# Patient Record
Sex: FEMALE | Race: AMERICAN INDIAN OR ALASKA NATIVE | NOT HISPANIC OR LATINO | URBAN - METROPOLITAN AREA
[De-identification: names, ages, dates, MRNs, and addresses within clinical notes are randomized per-mention and may not be internally consistent; named-entity substitution may affect disease eponyms.]

---

## 2019-03-14 ENCOUNTER — TELEPHONE (OUTPATIENT)
Dept: TRANSPLANT | Facility: CLINIC | Age: 29
End: 2019-03-14

## 2019-03-14 NOTE — TELEPHONE ENCOUNTER
"[facility] BREEZE  [id number]      LIVING KIDNEY DONOR EVALUATION  Donor First Name Cinthia Donor DOROTA    Donor Last Name Rowdy Completed 3/8/2019 9:53 PM    1990 Record ID [id number]   BREEZE Screen PASSED     Intended Recipient  Recipient First Name Gold Recipient MRSIVAN    Recipient Last Name Middletent Relationship Half Sibling   Recipient  2003 Recipient Diagnosis    Recipient's ABO      Donor Information  Age 28 Gender Female   Ht 165 cm (5' 5'') Race  or    Wt 61.2 kg (135 lbs) Ethnicity Not /   BMI 22.50 kg/m  Preferred Language English      Required No     Blood Type Unknown   Demographics  Home Address [address] [place] [address] # [phone]   [facility] Type Mobile   State [place] Alternate # [phone]   Zip Code [address] Type Mobile   Country [place] Preferred Contact day Mon, Fri, Thur, Wed, Tue   Email [email or web address] Preferred Contact time 1:00 PM-4:00 PM, 09:00 AM-11:00 AM   &&   Donor's Medical Information  Medical History \"none\" Medications \"Doterra Essential Oils\"   \"Equate one daily womens health multivitamin\"   Surgical History None Reported Allergies NKDA   Social History EtOH: Rare (1-2 drinks/year)   Illicit Drug Use: Remote: Cocaine Last Used    Tobacco: Remote; Quit ; (1/4 ppd x 2 years) Self-Reported Functional Status \"I am able to participate in strenuous sports such as swimming, singles tennis, football, basketball, or skiing\"   Family Medical History Cancer (denies)   Diabetes (Sibling, Grandparent)   Heart Disease (denies)   Hypertension (Sibling)   Kidney Disease (Sibling)   Kidney Stones (denies) Exercise Frequency Exercise (1 X per week)   Review of Organ Systems  Review of Systems Airway or Lungs: No   Blood Disorder: No   Cancer: No   Diabetes,Thyroid,Adrenal,Endocrine Disorder: No   Digestive or Liver: Yes   Female Health: No   Heart or Circulatory System: No "   Immune Diseases: No   Kidneys and Bladder: No   Muscles,Bones,Joints: No   Neuro: No   Psych: No   &&   Donor's Social Information  Marital Status  Living Accommodation Lives in rented accommodation   Level of 's or technical degree complete Living Arrangement With spouse   Employment Status Full Time Concerns: health and life insurance No   Employer [employer] Concerns: job security and lost income No   Occupation      Medical Insurance Status Has medical insurance     High Risk Behavior  High Risk Behaviors Blood transfusion < 12 months. (NO)   Commercial sex < 12 months. (NO)   Illicit IV drug use < 5yrs. (NO)   Other high risk sexual contact < 12 months. (NO)   Reason for Donation  Referral Friend or Family of Tx Candidate Reason for Donation I believe my brother will benefit from my kidney, and be free from doing dialysis and other issues related to his kidney disease that has affected his daily life.   Permission to Disclose Inquiry Yes Patient Comments    Donor Motivation Level Highly motivated donor     PCP Contact  PCP Name [facility]   PCP [facility]   PCP [place]   PCP Phone [phone]   Emergency Contact  First Name Jehovah's witness First Name Emma   Last Name Rowdy Last Name Rosalio Kitchen   Phone # [phone] Phone # [phone]   Phone Type Mobile Phone Type Mobile   Relationship Spouse Relationship Mother   Office Use  Reviewed By    Reviewed 3/14/2019 2:20 PM   Admin Folder Accept   Comments 3/11/2019 Eval Passed   3/14/2019 archived pkt sent   Lost for Followup    Extended Comments    BREEZE ID [id number] survey status completed   Open Activities  Other  Task    Due Date    Comments No voice mailbox.LM via Email to return call for screening.   Activity History  Call  Task    Due Date 3/14/2019   Last Modified Date/Time 3/14/2019 10:16 AM   Comments    Other  Task    Due Date 3/12/2019    Last Modified Date/Time 3/12/2019 9:48 AM   Comments

## 2019-03-15 DIAGNOSIS — Z00.5 TRANSPLANT DONOR EVALUATION: Primary | ICD-10-CM

## 2021-10-18 ENCOUNTER — DOCUMENTATION ONLY (OUTPATIENT)
Dept: TRANSPLANT | Facility: CLINIC | Age: 31
End: 2021-10-18

## 2021-10-25 ENCOUNTER — TELEPHONE (OUTPATIENT)
Dept: TRANSPLANT | Facility: CLINIC | Age: 31
End: 2021-10-25

## 2021-10-25 NOTE — TELEPHONE ENCOUNTER
I called Cinthia today to complete her initial CHEN call.  I got her voice mail message indicating that they voicemail box has not been set up yet and I was not able to leave a message.  Therefore, I could not do my call with her today.  I sent her an e-mail message to reschedule this phone call.    HELENA Tan, [facility]  Donor  and Independent Living Donor Advocate  [facility], [facility]  Pager:  [phone]  Direct:  [phone] Cell   E-Mail:  [email or web address]

## 2021-10-25 NOTE — TELEPHONE ENCOUNTER
Donor emailed requesting CHEN call reschedule.  Rescheduled to 230pm Tues 10/26 and emailed her the date/time.

## 2021-10-26 ENCOUNTER — TELEPHONE (OUTPATIENT)
Dept: TRANSPLANT | Facility: CLINIC | Age: 31
End: 2021-10-26

## 2021-10-26 NOTE — TELEPHONE ENCOUNTER
Initial Independent Living Donor Advocate contact made with potential donor today.  I introduced myself and my role during the donation process, includin.  CHEN ROLE   The federal government requires that all licensed transplant centers provide the living donor with an Independent Living Donor Advocate (CHEN).  I do not meet recipients or attend meetings that discuss their care or decision to transplant them. My role is separate to avoid any conflict of interest.  My role is to ensure:  1) your rights are protected;  2) you get all the information you need from the transplant team to make a fully informed decision whether to donate;   3) that living donation is in your best interest.   4) that you have the right to decide NOT to go forward with living donation at any time during this process.  I am available to you throughout the workup, during surgery phase and follow-up at home.   2. WORKUP & PRIVACY     Your identity and workup are not shared with the recipient at any time.     There is a medical donor workup that consists of testing to determine if you are healthy enough to donate.  Workup tests include many blood draws, urine collection/ (kidney function testing), chest x-ray, EKG, CT scan. As you complete each step then you may move on to the next.  Workup can take as little or as long as you need and you can stop the process at any time.     Transplant is a treatment option, not a cure. A kidney from a living kidney donor can last 12-14 years.  Other treatment options are  donation and two types of dialysis.     This is major surgery and your estimated hospital stay is approximately 1-2 nights.  After surgery, there are driving and lifting restrictions - no driving for two weeks and no lifting over ten pounds for 6 - 8 weeks.  Donors are routinely off from work for 4 - 6 weeks after surgery, and potentially longer if they have a physical job.       If you anticipate lost wages due to donation,  donor wage reimbursement options may be available to you and will be reviewed with you during the evaluation process.      The recipient's insurance covers the medical expenses related to the donor evaluation and surgery.  However, it is important for you to carry your own health insurance to address any medical issues that are found and are NOT related to living donation.  3.  QUESTIONS  Have you received a packet from the transplant department?     Questions?    Have you discussed with anyone your potential decision to donate?  Her mother  Is anyone pressuring or coercing you to donate? No.    Have you discussed any financial arrangements with recipient around donating a kidney? no  Are you aware that you can confidentially opt out at any time, up to and including day of donation? ye  At this time, would you like to proceed with the medical evaluation to see if you can be a kidney donor?ye    If yes, the donor coordinator will be reaching out to you with next steps.     You can reach me or someone else on the CHEN team by calling [phone] Option 3.    CHEN NOTES: She would like to move forward with the donor evaluation.  She is interested in further conversations about risks, sine she has an 11 year old daughter.  She [place] 10 hours from the transplant center, so I will e-mail her information about NLDAC.  An appointment with coordinator, Fabiola has been scheduled for 11/15    Duration of call 50 minutes including anton information.

## 2021-11-15 ENCOUNTER — TELEPHONE (OUTPATIENT)
Dept: TRANSPLANT | Facility: CLINIC | Age: 31
End: 2021-11-15

## 2021-11-15 NOTE — TELEPHONE ENCOUNTER
Contacted Cinthia Reno to introduce myself and my role, review of medical/surgical/family history and next steps.     Cinthia Reno  is aware She can stop donor evaluation at any time.    Have you ever been positive for COVID 19? no    Have you received the COVID vaccination? yes If yes, when and what brand? abhinav Reno is a 31 year old female  ABO O that would like to learn more about donation to her brother.     Concerns from medical/surgical/family history: non    Reviewed any history of travel in endemic areas:   Strongyloides- [place], [place] and [place].  Trypanosoma cruzi ([place])- [place]  West Nile Virus- [place], [place], [place], [place] and [place].     Per our Phase 1 algorithm, does meet criteria to do preliminary testing.    Reviewed preliminary lab tests.     Reviewed evaluation testing: Covid PCR, Iohexol, Lab work, CXR, EKG, Provider visits and functions, CT Angiogram.     Reviewed operations of selection committee and angio review meetings and the need for multidisciplinary input.     Reviewed NKR listing and transfer of care to [facility] team if approved. Provided Cinthia with NKR website to review.     Briefly went over options if approved of NDD, SVP and FVP.    Confirmed that Cinthia reviewed Informed consent document and all questions answered.  Reviewed that they will receive Docusign to obtain electronic signature for the following: Informed consent, SRTR data, NEERU for medical information, Auth for Electronic communication and will need their signed consent back before proceeding with evaluation.      Encouraged sign up for MyChart and confirmed [facility] sign up.     Verified recipient status if not NDD.    Donor timeline: TBD     Will send orders to scheduling team to set up for phase 1 testing.

## 2021-11-16 ENCOUNTER — DOCUMENTATION ONLY (OUTPATIENT)
Dept: TRANSPLANT | Facility: CLINIC | Age: 31
End: 2021-11-16

## 2021-11-16 DIAGNOSIS — Z00.5 TRANSPLANT DONOR EVALUATION: Primary | ICD-10-CM

## 2021-12-01 ENCOUNTER — TELEPHONE (OUTPATIENT)
Dept: TRANSPLANT | Facility: CLINIC | Age: 31
End: 2021-12-01

## 2021-12-03 ENCOUNTER — DOCUMENTATION ONLY (OUTPATIENT)
Dept: TRANSPLANT | Facility: CLINIC | Age: 31
End: 2021-12-03

## 2021-12-03 NOTE — PROGRESS NOTES
"[facility] from [facility] (no phone #):BP's:102/56,115/55,115/62 Ht= 5' 6\" Qg=899.5#'s  "

## 2021-12-05 ENCOUNTER — HEALTH MAINTENANCE LETTER (OUTPATIENT)
Age: 31
End: 2021-12-05

## 2021-12-07 ENCOUNTER — TELEPHONE (OUTPATIENT)
Dept: TRANSPLANT | Facility: CLINIC | Age: 31
End: 2021-12-07

## 2021-12-07 NOTE — TELEPHONE ENCOUNTER
Informed Cinthia that we have recewived her vitals and only her abo. Asked her to check with clinic to see if other labs can be sent.

## 2021-12-08 ENCOUNTER — TELEPHONE (OUTPATIENT)
Dept: TRANSPLANT | Facility: CLINIC | Age: 31
End: 2021-12-08

## 2021-12-08 NOTE — TELEPHONE ENCOUNTER
Contacted Cinthia to review phase 1 results    Reviewed preliminary lab tests.     Reviewed evaluation testing: Covid PCR, Iohexol, Lab work, CXR, EKG, Provider visits and functions, CT Angiogram.     Reviewed operations of selection committee and angio review meetings and the need for multidisciplinary input.     Reviewed NKR listing and transfer of care to [facility] team if approved. Provided Cinthia with NKR website to review.     Briefly went over options if approved of NDD, SVP and FVP.     Cinthia would like to proceed to evaluation on 12/30/21 if possible with Iohexol on 12/30/21 and COVID PCR prior to Iohexol.     Confirmed that Cinthia reviewed Informed consent document and all questions answered.  Reviewed that they will receive Docusign to obtain electronic signature for the following: Informed consent, SRTR data, NEERU for medical information, Auth for Electronic communication and will need their signed consent back before proceeding with evaluation.      Encouraged sign up for MyChart and confirmed [facility] sign up.    Verified recipient status if not NDD.    Donor timeline: TBD     Will send orders to scheduling team to set up for evaluation testing.

## 2021-12-09 ENCOUNTER — TELEPHONE (OUTPATIENT)
Dept: TRANSPLANT | Facility: CLINIC | Age: 31
End: 2021-12-09

## 2021-12-09 NOTE — TELEPHONE ENCOUNTER
I attempted to call Cinthia today, but I could not leave her a voice mail message since the recording I get says that her voice mail box had not been set up yet.  I emailed her today about getting in her NLDA application so that she can have assistance with travel here for her donor evaluation on 12/30/2021.    HELENA Tan, [facility]  Living Donor   [facility], [facility], [facility]  Pager:  [phone]  Direct:  [phone] Cell Phone  E-Mail:  [email or web address]

## 2021-12-10 DIAGNOSIS — Z00.5 TRANSPLANT DONOR EVALUATION: Primary | ICD-10-CM

## 2021-12-10 RX ORDER — ALBUTEROL SULFATE 90 UG/1
1-2 AEROSOL, METERED RESPIRATORY (INHALATION)
Status: CANCELLED
Start: 2021-12-30

## 2021-12-10 RX ORDER — ALBUTEROL SULFATE 0.83 MG/ML
2.5 SOLUTION RESPIRATORY (INHALATION)
Status: CANCELLED | OUTPATIENT
Start: 2021-12-30

## 2021-12-10 RX ORDER — METHYLPREDNISOLONE SODIUM SUCCINATE 125 MG/2ML
125 INJECTION, POWDER, LYOPHILIZED, FOR SOLUTION INTRAMUSCULAR; INTRAVENOUS
Status: CANCELLED
Start: 2021-12-30

## 2021-12-10 RX ORDER — MEPERIDINE HYDROCHLORIDE 25 MG/ML
25 INJECTION INTRAMUSCULAR; INTRAVENOUS; SUBCUTANEOUS EVERY 30 MIN PRN
Status: CANCELLED | OUTPATIENT
Start: 2021-12-30

## 2021-12-10 RX ORDER — EPINEPHRINE 1 MG/ML
0.3 INJECTION, SOLUTION, CONCENTRATE INTRAVENOUS EVERY 5 MIN PRN
Status: CANCELLED | OUTPATIENT
Start: 2021-12-30

## 2021-12-10 RX ORDER — DIPHENHYDRAMINE HYDROCHLORIDE 50 MG/ML
50 INJECTION INTRAMUSCULAR; INTRAVENOUS
Status: CANCELLED
Start: 2021-12-30

## 2021-12-10 RX ORDER — NALOXONE HYDROCHLORIDE 0.4 MG/ML
0.2 INJECTION, SOLUTION INTRAMUSCULAR; INTRAVENOUS; SUBCUTANEOUS
Status: CANCELLED | OUTPATIENT
Start: 2021-12-30

## 2022-01-19 DIAGNOSIS — Z00.5 TRANSPLANT DONOR EVALUATION: ICD-10-CM

## 2022-01-20 ENCOUNTER — OFFICE VISIT (OUTPATIENT)
Dept: INFUSION THERAPY | Facility: CLINIC | Age: 32
End: 2022-01-20
Attending: INTERNAL MEDICINE

## 2022-01-20 ENCOUNTER — APPOINTMENT (OUTPATIENT)
Dept: TRANSPLANT | Facility: CLINIC | Age: 32
End: 2022-01-20
Attending: TRANSPLANT SURGERY

## 2022-01-20 ENCOUNTER — OFFICE VISIT (OUTPATIENT)
Dept: TRANSPLANT | Facility: CLINIC | Age: 32
End: 2022-01-20

## 2022-01-20 ENCOUNTER — OFFICE VISIT (OUTPATIENT)
Dept: NEPHROLOGY | Facility: CLINIC | Age: 32
End: 2022-01-20
Attending: TRANSPLANT SURGERY

## 2022-01-20 ENCOUNTER — OFFICE VISIT (OUTPATIENT)
Dept: TRANSPLANT | Facility: CLINIC | Age: 32
End: 2022-01-20
Attending: SURGERY

## 2022-01-20 ENCOUNTER — ALLIED HEALTH/NURSE VISIT (OUTPATIENT)
Dept: TRANSPLANT | Facility: CLINIC | Age: 32
End: 2022-01-20
Attending: TRANSPLANT SURGERY

## 2022-01-20 ENCOUNTER — LAB (OUTPATIENT)
Dept: LAB | Facility: CLINIC | Age: 32
End: 2022-01-20
Attending: INTERNAL MEDICINE

## 2022-01-20 ENCOUNTER — ANCILLARY PROCEDURE (OUTPATIENT)
Dept: CT IMAGING | Facility: CLINIC | Age: 32
End: 2022-01-20
Attending: INTERNAL MEDICINE

## 2022-01-20 ENCOUNTER — ANCILLARY PROCEDURE (OUTPATIENT)
Dept: GENERAL RADIOLOGY | Facility: CLINIC | Age: 32
End: 2022-01-20
Attending: INTERNAL MEDICINE

## 2022-01-20 VITALS
HEART RATE: 74 BPM | BODY MASS INDEX: 23.74 KG/M2 | SYSTOLIC BLOOD PRESSURE: 103 MMHG | HEIGHT: 65 IN | OXYGEN SATURATION: 100 % | DIASTOLIC BLOOD PRESSURE: 69 MMHG | WEIGHT: 142.5 LBS

## 2022-01-20 VITALS — SYSTOLIC BLOOD PRESSURE: 98 MMHG | DIASTOLIC BLOOD PRESSURE: 55 MMHG

## 2022-01-20 DIAGNOSIS — Z00.5 TRANSPLANT DONOR EVALUATION: ICD-10-CM

## 2022-01-20 DIAGNOSIS — Z00.5 TRANSPLANT DONOR EVALUATION: Primary | ICD-10-CM

## 2022-01-20 LAB
ABO/RH(D): NORMAL
ABO/RH(D): NORMAL
ALBUMIN SERPL-MCNC: 3.8 G/DL (ref 3.4–5)
ALBUMIN UR-MCNC: NEGATIVE MG/DL
ALP SERPL-CCNC: 55 U/L (ref 40–150)
ALT SERPL W P-5'-P-CCNC: 19 U/L (ref 0–50)
ANION GAP SERPL CALCULATED.3IONS-SCNC: 9 MMOL/L (ref 3–14)
ANTIBODY SCREEN: NEGATIVE
APPEARANCE UR: CLEAR
APTT PPP: 32 SECONDS (ref 22–38)
AST SERPL W P-5'-P-CCNC: 14 U/L (ref 0–45)
B-HCG SERPL-ACNC: <1 IU/L (ref 0–5)
BILIRUB SERPL-MCNC: 0.3 MG/DL (ref 0.2–1.3)
BILIRUB UR QL STRIP: NEGATIVE
BUN SERPL-MCNC: 14 MG/DL (ref 7–30)
CALCIUM SERPL-MCNC: 8.6 MG/DL (ref 8.5–10.1)
CHLORIDE BLD-SCNC: 111 MMOL/L (ref 94–109)
CHOLEST SERPL-MCNC: 160 MG/DL
CMV IGG SERPL IA-ACNC: 7.8 U/ML
CMV IGG SERPL IA-ACNC: ABNORMAL
CO2 SERPL-SCNC: 23 MMOL/L (ref 20–32)
COLOR UR AUTO: ABNORMAL
CREAT SERPL-MCNC: 0.75 MG/DL (ref 0.52–1.04)
CREAT UR-MCNC: 50 MG/DL
CREAT UR-MCNC: 50 MG/DL
EBV VCA IGG SER IA-ACNC: >750 U/ML
EBV VCA IGG SER IA-ACNC: POSITIVE
EBV VCA IGM SER IA-ACNC: <10 U/ML
EBV VCA IGM SER IA-ACNC: NORMAL
ERYTHROCYTE [DISTWIDTH] IN BLOOD BY AUTOMATED COUNT: 17.2 % (ref 10–15)
FASTING STATUS PATIENT QL REPORTED: YES
GFR SERPL CREATININE-BSD FRML MDRD: >90 ML/MIN/1.73M2
GLUCOSE BLD-MCNC: 92 MG/DL (ref 70–99)
GLUCOSE UR STRIP-MCNC: NEGATIVE MG/DL
HBA1C MFR BLD: 5.2 % (ref 0–5.6)
HBV CORE AB SERPL QL IA: NONREACTIVE
HBV SURFACE AB SERPL IA-ACNC: 664.32 M[IU]/ML
HBV SURFACE AG SERPL QL IA: NONREACTIVE
HCT VFR BLD AUTO: 34.8 % (ref 35–47)
HCV AB SERPL QL IA: NONREACTIVE
HDLC SERPL-MCNC: 50 MG/DL
HGB BLD-MCNC: 10.9 G/DL (ref 11.7–15.7)
HGB UR QL STRIP: NEGATIVE
HIV 1+2 AB+HIV1 P24 AG SERPL QL IA: NONREACTIVE
INR PPP: 1.11 (ref 0.85–1.15)
KETONES UR STRIP-MCNC: NEGATIVE MG/DL
LDLC SERPL CALC-MCNC: 95 MG/DL
LEUKOCYTE ESTERASE UR QL STRIP: NEGATIVE
MCH RBC QN AUTO: 25.2 PG (ref 26.5–33)
MCHC RBC AUTO-ENTMCNC: 31.3 G/DL (ref 31.5–36.5)
MCV RBC AUTO: 80 FL (ref 78–100)
MICROALBUMIN UR-MCNC: <5 MG/L
MICROALBUMIN/CREAT UR: NORMAL MG/G{CREAT}
MUCOUS THREADS #/AREA URNS LPF: PRESENT /LPF
NITRATE UR QL: NEGATIVE
NONHDLC SERPL-MCNC: 110 MG/DL
PH UR STRIP: 6 [PH] (ref 5–7)
PHOSPHATE SERPL-MCNC: 3.6 MG/DL (ref 2.5–4.5)
PLATELET # BLD AUTO: 361 10E3/UL (ref 150–450)
POTASSIUM BLD-SCNC: 4.1 MMOL/L (ref 3.4–5.3)
PROT SERPL-MCNC: 7.4 G/DL (ref 6.8–8.8)
PROT UR-MCNC: <0.05 G/L
PROT/CREAT 24H UR: NORMAL MG/G{CREAT}
RBC # BLD AUTO: 4.33 10E6/UL (ref 3.8–5.2)
RBC URINE: <1 /HPF
SODIUM SERPL-SCNC: 143 MMOL/L (ref 133–144)
SP GR UR STRIP: 1.02 (ref 1–1.03)
SPECIMEN EXPIRATION DATE: NORMAL
SPECIMEN EXPIRATION DATE: NORMAL
SQUAMOUS EPITHELIAL: <1 /HPF
T PALLIDUM AB SER QL: NONREACTIVE
TRIGL SERPL-MCNC: 76 MG/DL
URATE SERPL-MCNC: 3.3 MG/DL (ref 2.6–6)
UROBILINOGEN UR STRIP-MCNC: NORMAL MG/DL
WBC # BLD AUTO: 7.4 10E3/UL (ref 4–11)
WBC URINE: 1 /HPF

## 2022-01-20 PROCEDURE — 86789 WEST NILE VIRUS ANTIBODY: CPT

## 2022-01-20 PROCEDURE — 74175 CTA ABDOMEN W/CONTRAST: CPT | Mod: GC | Performed by: RADIOLOGY

## 2022-01-20 PROCEDURE — 86780 TREPONEMA PALLIDUM: CPT | Performed by: INTERNAL MEDICINE

## 2022-01-20 PROCEDURE — 36415 COLL VENOUS BLD VENIPUNCTURE: CPT | Performed by: TRANSPLANT SURGERY

## 2022-01-20 PROCEDURE — 86901 BLOOD TYPING SEROLOGIC RH(D): CPT | Performed by: INTERNAL MEDICINE

## 2022-01-20 PROCEDURE — 80053 COMPREHEN METABOLIC PANEL: CPT

## 2022-01-20 PROCEDURE — 86665 EPSTEIN-BARR CAPSID VCA: CPT | Performed by: INTERNAL MEDICINE

## 2022-01-20 PROCEDURE — 71046 X-RAY EXAM CHEST 2 VIEWS: CPT | Mod: GC | Performed by: RADIOLOGY

## 2022-01-20 PROCEDURE — 81001 URINALYSIS AUTO W/SCOPE: CPT

## 2022-01-20 PROCEDURE — 36415 COLL VENOUS BLD VENIPUNCTURE: CPT

## 2022-01-20 PROCEDURE — 86704 HEP B CORE ANTIBODY TOTAL: CPT | Performed by: INTERNAL MEDICINE

## 2022-01-20 PROCEDURE — 99215 OFFICE O/P EST HI 40 MIN: CPT | Mod: 95 | Performed by: TRANSPLANT SURGERY

## 2022-01-20 PROCEDURE — 86803 HEPATITIS C AB TEST: CPT | Performed by: INTERNAL MEDICINE

## 2022-01-20 PROCEDURE — 84156 ASSAY OF PROTEIN URINE: CPT

## 2022-01-20 PROCEDURE — 250N000011 HC RX IP 250 OP 636: Performed by: INTERNAL MEDICINE

## 2022-01-20 PROCEDURE — 85730 THROMBOPLASTIN TIME PARTIAL: CPT

## 2022-01-20 PROCEDURE — 82542 COL CHROMOTOGRAPHY QUAL/QUAN: CPT | Performed by: TRANSPLANT SURGERY

## 2022-01-20 PROCEDURE — 82043 UR ALBUMIN QUANTITATIVE: CPT

## 2022-01-20 PROCEDURE — 84100 ASSAY OF PHOSPHORUS: CPT

## 2022-01-20 PROCEDURE — 86644 CMV ANTIBODY: CPT | Performed by: INTERNAL MEDICINE

## 2022-01-20 PROCEDURE — 87340 HEPATITIS B SURFACE AG IA: CPT | Performed by: INTERNAL MEDICINE

## 2022-01-20 PROCEDURE — 86481 TB AG RESPONSE T-CELL SUSP: CPT | Performed by: INTERNAL MEDICINE

## 2022-01-20 PROCEDURE — 99205 OFFICE O/P NEW HI 60 MIN: CPT

## 2022-01-20 PROCEDURE — 83036 HEMOGLOBIN GLYCOSYLATED A1C: CPT

## 2022-01-20 PROCEDURE — 86706 HEP B SURFACE ANTIBODY: CPT | Performed by: INTERNAL MEDICINE

## 2022-01-20 PROCEDURE — 80061 LIPID PANEL: CPT

## 2022-01-20 PROCEDURE — 84550 ASSAY OF BLOOD/URIC ACID: CPT

## 2022-01-20 PROCEDURE — 86901 BLOOD TYPING SEROLOGIC RH(D): CPT

## 2022-01-20 PROCEDURE — 84702 CHORIONIC GONADOTROPIN TEST: CPT

## 2022-01-20 PROCEDURE — 85610 PROTHROMBIN TIME: CPT

## 2022-01-20 PROCEDURE — 85027 COMPLETE CBC AUTOMATED: CPT

## 2022-01-20 PROCEDURE — 81378 HLA I & II TYPING HR: CPT

## 2022-01-20 RX ORDER — EPINEPHRINE 1 MG/ML
0.3 INJECTION, SOLUTION INTRAMUSCULAR; SUBCUTANEOUS EVERY 5 MIN PRN
Status: CANCELLED | OUTPATIENT
Start: 2022-01-20

## 2022-01-20 RX ORDER — METHYLPREDNISOLONE SODIUM SUCCINATE 125 MG/2ML
125 INJECTION, POWDER, LYOPHILIZED, FOR SOLUTION INTRAMUSCULAR; INTRAVENOUS
Status: CANCELLED
Start: 2022-01-20

## 2022-01-20 RX ORDER — NALOXONE HYDROCHLORIDE 0.4 MG/ML
0.2 INJECTION, SOLUTION INTRAMUSCULAR; INTRAVENOUS; SUBCUTANEOUS
Status: CANCELLED | OUTPATIENT
Start: 2022-01-20

## 2022-01-20 RX ORDER — ALBUTEROL SULFATE 90 UG/1
1-2 AEROSOL, METERED RESPIRATORY (INHALATION)
Status: CANCELLED
Start: 2022-01-20

## 2022-01-20 RX ORDER — DIPHENHYDRAMINE HYDROCHLORIDE 50 MG/ML
50 INJECTION INTRAMUSCULAR; INTRAVENOUS
Status: CANCELLED
Start: 2022-01-20

## 2022-01-20 RX ORDER — ALBUTEROL SULFATE 0.83 MG/ML
2.5 SOLUTION RESPIRATORY (INHALATION)
Status: CANCELLED | OUTPATIENT
Start: 2022-01-20

## 2022-01-20 RX ORDER — IOPAMIDOL 755 MG/ML
100 INJECTION, SOLUTION INTRAVASCULAR ONCE
Status: COMPLETED | OUTPATIENT
Start: 2022-01-20 | End: 2022-01-20

## 2022-01-20 RX ORDER — MEPERIDINE HYDROCHLORIDE 25 MG/ML
25 INJECTION INTRAMUSCULAR; INTRAVENOUS; SUBCUTANEOUS EVERY 30 MIN PRN
Status: CANCELLED | OUTPATIENT
Start: 2022-01-20

## 2022-01-20 RX ADMIN — IOPAMIDOL 100 ML: 755 INJECTION, SOLUTION INTRAVASCULAR at 12:24

## 2022-01-20 RX ADMIN — IOHEXOL 5 ML: 300 INJECTION, SOLUTION INTRAVENOUS at 07:41

## 2022-01-20 ASSESSMENT — MIFFLIN-ST. JEOR: SCORE: 1352.26

## 2022-01-20 NOTE — LETTER
Date:January 28, 2022      Patient was self referred, no letter generated. Do not send.        [facility] Health Information

## 2022-01-20 NOTE — PROGRESS NOTES
Saw Cinthia in clinic on 22 for Living Kidney Donor Evaluation.     Cinthia is interested in donation for her brother.    I provided a folder which included copies of the followin. Living Kidney Donor Evaluation Consent  2. Paired Exchange Consent  3. Donor Shield Pamphlet  4. Living Donor Collective Study information  5. Kidney for Life pamphlet  6. Kidney Donors are Heroes! Study synopsis  7. SRTR Data from 21.      I also provided a parking pass.    I reviewed the Living Kidney Donor Evaluation Consent, dated 2020 and Paired Exchange/ NDD consent dated 2018.  I answered any question.    Evaluation Notes:  1. Internal tissue typing collected  2. hgb 10.9

## 2022-01-20 NOTE — PROGRESS NOTES
Living Kidney Donor Consent per OPTN Policy 14.2 for Independent Living Donor Advocate (CHEN)    Organ Type: Related Kidney Donor  Presenting Information:  Carmencita Reno presents to [facility] to complete a living donor evaluation since she is interested in becoming a kidney donor for her little brother, Gold Conde.  She is here today with her boyfriend.  .      Written assurance has been obtained from the potential donor that he/she:   Is willing to donate  Is free from inducement and coercion  Has been informed that the he/she may decline to donate at any time  Has been informed that transplant centers must:   A) Offer donors an opportunity to discontinue the donor consent or evaluation process in a way that is protected and confidential  B) Provide an independent living donor advocate (CHEN) to assist the potential donor during this process    The following was presented to the potential donor in a language in which the potential donor is able to engage in meaningful dialogue:   Education and instruction about all phases of the living donation process including:   Consent  Medical and psychosocial evaluation  Information about the surgical procedure  Pre and post operative care  Benefits of post operative follow up  Disclosure that the recovery hospital will take all reasonable precautions to provide confidentiality for the donor/recipient  Disclosure that it is a federal crime for any person to knowingly acquire, obtain or otherwise transfer any human organ for valuable consideration  Disclosure that the recovery hospital must provide an independent living donor advocate (CHEN)  Disclosure that health information obtained during the evaluation is subject to the same regulations as all records and could reveal conditions that must be reported to local, state, or federal public health authorities  Disclosure that the recovery  hospital is required to report living donor follow up information at 6 months, 1 year, and 2 years, and that the potential donor must commit to post operative follow up testing coordinated by the [facility] hospital    Disclosure has been provided that these risks may be transient or permanent & include but are not limited to:  Potential psychosocial risks:  Problems with body image  Post-surgery depression or anxiety  Feelings of emotional distress or bereavement if recipient experiences any recurrent disease or in the event of the recipient s death  Impact of donation on the donor s lifestyle, such as limited ability to exercise in the short term post operative recovery period, no driving for the first 2 weeks post op or until the donor is no longer needing pain medications that impair the ability to drive.      Potential financial impacts:  Personal expenses of travel, housing, , lost wages related to donation might not be reimbursed. However, resources may be available to defray some donation-related expenses.   Need for life-long follow up at the donor s expense  Loss of employment or income  Negative impact on the ability to obtain future employment  Negative impact on the ability to obtain, maintain, or afford health, disability, and life insurance  Future health problems experienced by living donors following donation may not be covered by the recipient s insurance      PREPARATION FOR DONATION, RECOVERY, AND POTENTIAL SHORT-LONG-TERM OUTCOMES:  Understanding of the Living Donation Process:  We discussed the role of Independent Living Donor Advocate.  Short and long term medical and psychosocial risks to both, donor and recipient were reviewed and she appears capable of understanding the risks.  Post surgical restrictions (2 weeks no driving, 6 weeks no lifting over 10 lbs) were reviewed and she appears capable of adhering to the post surgical requirements. The need for a caregiver was discussed and  her boyfriend states he is able to help care for her .  The risk of poor psychosocial outcome including problems with body image, post-surgery depression or anxiety, or feelings of emotional distress or bereavement if recipient experiences any recurrent disease, poor outcome or death was reviewed.  Additionally, potential financial implications, including the risk of having difficulty obtaining health care insurance, life insurance, disability insurance, or long term care insurance were reviewed, as were available donor grants to assist with donor related expenses.      Impressions/Recommendations:  Cinthia appears highly motivated to donate a kidney to her little brother.  She denies pressure, inducement or coercion.  She turned her NLDA paperwork in to Alena Rojas today and would benefit from travel and wage reimbursement if she comes back for donation.    She appears capable of understanding this information and making an informed medical decision.  As CHEN, no contraindications were identified.  She is aware that I am available throughout all phases of the donor evaluation.    Contact Information:  HELENA ZAIDI, [facility]   Independent Donor Advocate  [facility]  Phone - [phone]  Pager - [phone]  [email or web address]      Time Spent: 30 minutes

## 2022-01-20 NOTE — LETTER
1/20/2022       RE: Cinthia Reno  [address]  [place] [address]     Dear Colleague,    Thank you for referring your patient, Cinthia Reno, to the [facility] [place] at [facility]. Please see a copy of my visit note below.    TRANSPLANT NEPHROLOGY DONOR EVALUATION    Assessment and Plan:  # Prospective Kidney Transplant Donor: Patient with one issue that need to be addressed prior to donation. Patient's blood pressure is acceptable at this visit, kidney function appears to be acceptable with Iohexol pending, and urinalysis is bland.    # Anemia: new onset, unclear in origin, may be related to heavy cycles. Will repeat HGB and will check iron stores   # Pregnancy counseling: we discussed the risk of preeclampsia before and after donation.     Discussed the risks of donating a kidney, including the surgical risk and the possible risks of living with one kidney.    Education about expected post-donation kidney function and how chronic kidney disease (CKD) and end stage kidney disease (ESKD) might potentially impact the donor in the future, include, but not limited to:       - On average, donors will have 25-35% permanent loss of kidney function at donation.       - Baseline risk of ESKD may slightly exceed that of members of the general population with the same demographic profile.       - Donor risks must be interpreted in light of known epidemiology of both CKD or ESKD, such as that CKD generally develops in midlife (40-50 years old) and ESKD generally develops after age 60.       - Medical evaluation of young potential donors cannot predict lifetime risk of CKD or ESKD.       - Donors may be at higher risk for CKD if they sustain damage to the remaining kidney.       - Development of CKD and progression of ESKD may be more rapid with only 1 kidney.       - Some type of kidney replacement therapy, either kidney  transplant or dialysis, is required when reaching ESKD.    Potential medical or surgical risks include, but not limited to:       - Death.       - Scars, pain, fatigue, and other consequences typical of any surgical procedure.       - Decreased kidney function.       - Abdominal or bowel symptoms, such as bloating and nausea, and developing bowel obstruction.       - Kidney failure (ESKD) and the need for a kidney transplant or dialysis for the donor.       - Impact of obesity, hypertension, or other donor-specific medical conditions on morbidity and mortality of the potential donor.    Patients overall evaluation will be discussed with the transplant team and a final recommendation on the patients' suitability to be a kidney transplant donor will be made at that time.    Consult:  Cinthia Reno was seen in consultation at the request of Dr. Catracho Gorman for evaluation as a potential kidney transplant donor.    Reason for Visit:  Cinthia Reno is a 31 year old female who presents for a kidney donor evaluation.  Patient would like to donate to brother..    Present Condition and Donor-Related Medical History:      Cinthia is a delightful 31 year old who is interested in donating a kidney to her brother (half brother). She is generally healthy. She works full time. She was accompanied by her boy friend to the visit. We discussed the recent drop in her hemoglobin. She did not recall any specific reason but did report heavier cycles at times but nothing out of the ordinary.          Kidney Disease Hx:       h/o Kidney Problems: No  Family h/o Genetic Kidney Disease: No       h/o Hypertension: No    Usual Blood Pressure: wnl       h/o Protein in Urine: No  h/o Blood in Urine: No       h/o Kidney Stones: No  h/o Kidney Injury: No       h/o Recurrent UTI: No  h/o Genitourinary Problems: No       h/o Chronic NSAID Use: No         Other Medical Hx:       h/o Diabetes: No             h/o Gastrointestinal, Pancreas or  Liver Problems: No       h/o Lung or Heart Problems: No       h/o Hematologic Problems: No  h/o Bleeding or Clotting Problems: No       h/o Cancer: No       h/o Infection Problems: No       h/o Gestational DM: No      h/o Preeclampsia: No         Skin Cancer Risk:       h/o more than 50 moles: No       h/o extensive sun exposure: No       h/o melanoma: No       Family h/o melanoma: No         Mental Health Assessment:       h/o Depression: No       h/o Psychiatric Illness: No       h/o Suicidal Attempt(s): No    COVID Status:  Vaccination Up To Date: Yes  H/o COVID Infection: No     Review Of Systems:   A comprehensive review of systems was obtained and negative, except as noted in the HPI or PMH.    Past Medical History:   History was taken from the patient as noted below.  Past Medical History:   Diagnosis Date     Known health problems: none        Past Social History:   Past Surgical History:   Procedure Laterality Date     NO HISTORY OF SURGERY       Personal or family history of anesthesia problems: No    Family History:   Family History   Problem Relation Age of Onset     No Known Problems Mother      No Known Problems Father      Kidney Disease Brother      Diabetes Brother      No Known Problems Brother      No Known Problems Brother      Liver Disease Maternal Grandmother      Unknown/Adopted Paternal Grandmother      Unknown/Adopted Paternal Grandfather      No Known Problems Daughter           Specific Family History in First Degree Relatives:       FH of Kidney Dz: yes(1/2 brother) FH of Diabetes: Yes        FH of Hypertension: Yes   FH of CAD: No       FH of Cancer: No  FH of Kidney Cancer: No    Personal History:   Social History     Socioeconomic History     Marital status: Single     Spouse name: Not on file     Number of children: Not on file     Years of education: Not on file     Highest education level: Not on file   Occupational History     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   Tobacco Use     Smoking status: Never Smoker     Smokeless tobacco: Never Used   Substance and Sexual Activity     Alcohol use: Not Currently     Drug use: Not Currently     Sexual activity: Not on file   Other Topics Concern     Not on file   Social History Narrative     Not on file     Social Determinants of Health     Financial Resource Strain: Not on file   Food Insecurity: Not on file   Transportation Needs: Not on file   Physical Activity: Not on file   Stress: Not on file   Social Connections: Not on file   Intimate Partner Violence: Not on file   Housing Stability: Not on file          Specific Social History:       Health Insurance Status: Yes ([facility])       Employment Status: Full time  Occupation: Social work                   Living Arrangements: lives with an adult        Social Support: Yes       Presence of increased risk for disease transmission behaviors as defined by [facility] guidelines: No        Allergies:  No Known Allergies    Medications:  No current outpatient medications on file.     No current facility-administered medications for this visit.     There are no discontinued medications.      Vitals:  Vital Signs 1/20/2022 1/20/2022 1/20/2022   Systolic 101 108 103   Diastolic 66 72 69   Pulse 74 - -   Weight (LB) 142 lb 8 oz - -   Height 5' 5\" - -   BMI (Calculated) 23.71 - -   O2 100 - -       Exam:   GENERAL APPEARANCE: alert and no distress  HENT: mouth without ulcers or lesions  LYMPHATICS: no cervical or supraclavicular nodes  RESP: lungs clear to auscultation - no rales, rhonchi or wheezes  CV: regular rhythm, normal rate, no rub, no murmur  EDEMA: no LE edema bilaterally  ABDOMEN: soft, nondistended, nontender, bowel sounds normal  MS: extremities normal - no gross deformities noted, no evidence of inflammation in joints, no muscle tenderness  SKIN: no rash    Results:   Labs and imaging were ordered for this visit and reviewed by me.  Recent Results (from the past 336 " hour(s))   Partial thromboplastin time    Collection Time: 01/20/22  7:17 AM   Result Value Ref Range    aPTT 32 22 - 38 Seconds   INR    Collection Time: 01/20/22  7:17 AM   Result Value Ref Range    INR 1.11 0.85 - 1.15   HCG quantitative pregnancy    Collection Time: 01/20/22  7:19 AM   Result Value Ref Range    hCG Quantitative <1 0 - 5 IU/L   Phosphorus    Collection Time: 01/20/22  7:19 AM   Result Value Ref Range    Phosphorus 3.6 2.5 - 4.5 mg/dL   Uric acid    Collection Time: 01/20/22  7:19 AM   Result Value Ref Range    Uric Acid 3.3 2.6 - 6.0 mg/dL   Lipid Profile    Collection Time: 01/20/22  7:19 AM   Result Value Ref Range    Cholesterol 160 <200 mg/dL    Triglycerides 76 <150 mg/dL    Direct Measure HDL 50 >=50 mg/dL    LDL Cholesterol Calculated 95 <=100 mg/dL    Non HDL Cholesterol 110 <130 mg/dL    Patient Fasting > 8hrs? Yes    Comprehensive metabolic panel    Collection Time: 01/20/22  7:19 AM   Result Value Ref Range    Sodium 143 133 - 144 mmol/L    Potassium 4.1 3.4 - 5.3 mmol/L    Chloride 111 (H) 94 - 109 mmol/L    Carbon Dioxide (CO2) 23 20 - 32 mmol/L    Anion Gap 9 3 - 14 mmol/L    Urea Nitrogen 14 7 - 30 mg/dL    Creatinine 0.75 0.52 - 1.04 mg/dL    Calcium 8.6 8.5 - 10.1 mg/dL    Glucose 92 70 - 99 mg/dL    Alkaline Phosphatase 55 40 - 150 U/L    AST 14 0 - 45 U/L    ALT 19 0 - 50 U/L    Protein Total 7.4 6.8 - 8.8 g/dL    Albumin 3.8 3.4 - 5.0 g/dL    Bilirubin Total 0.3 0.2 - 1.3 mg/dL    GFR Estimate >90 >60 mL/min/1.73m2   CBC with platelets    Collection Time: 01/20/22  7:20 AM   Result Value Ref Range    WBC Count 7.4 4.0 - 11.0 10e3/uL    RBC Count 4.33 3.80 - 5.20 10e6/uL    Hemoglobin 10.9 (L) 11.7 - 15.7 g/dL    Hematocrit 34.8 (L) 35.0 - 47.0 %    MCV 80 78 - 100 fL    MCH 25.2 (L) 26.5 - 33.0 pg    MCHC 31.3 (L) 31.5 - 36.5 g/dL    RDW 17.2 (H) 10.0 - 15.0 %    Platelet Count 361 150 - 450 10e3/uL   Hemoglobin A1c    Collection Time: 01/20/22  7:20 AM   Result Value Ref  Range    Hemoglobin A1C 5.2 0.0 - 5.6 %   Adult Type and Screen    Collection Time: 01/20/22  7:20 AM   Result Value Ref Range    ABO/RH(D) O POS     Antibody Screen Negative Negative    SPECIMEN EXPIRATION DATE [id number]                Again, thank you for allowing me to participate in the care of your patient.      Sincerely,     Kidney Donor Mike

## 2022-01-20 NOTE — LETTER
1/20/2022         RE: Cinthia Reno  [address]  [place] [address]        Dear Colleague,    Thank you for referring your patient, Cinthia Reno, to the [facility]. Please see a copy of my visit note below.    Video-Visit Details     Type of service: Video Visit     Video Start Time: 9:00am  Video Stop Time: 9:30am  Originating Location (pt. Location): [facility] [place]     Distant Location (provider location):  [facility] TRANSPL[facility]     Platform used for Video Visit: Doxity    Transplant Surgery Consult Note    Medical record number: [id number]  YOB: 1990,   Consult requested by the patient for evaluation of kidney donation candidacy.    Assessment and Recommendations: Ms. Reno appears to be a good candidate for kidney donation at this point in the evaluation. The following issues will need to be addressed prior to formal review:    CT abdomen and pelvis with contrast to be ordered for assessment vascular anatomy: Yes  Dietician consult ordered: Yes  Social work consult ordered: Yes  CXR and EKG ordered:  Yes  Transplant donor labs ordered to include HLA, ABOx2, Cr, Iohexol GFR or Cr clearance, virology etc.       Patient would like to donate to brother. The majority of our visit today was spent in counseling regarding the medical and surgical risks of kidney donation, the typical joss-and post-operative experience and recovery/return to work pattern.  We also talked about post-op visits and longer term health care maintenance, as well as the implications of having one remaining kidney. This discussion included, but was not limited to rates of complications such as bleeding, infection, need for transfusion, reoperation, other organ injury, future bowel obstruction, incisional hernia, port site pain, venous thrombosis, pulmonary embolism, renal failure, and death (3 per 10,000).     We discussed long-term risks in detail.  I discussed the  articles suggesting a small increase in ESKD in donors and their limitations    We discussed pregnancy postdonation and the increased risk of hypertension and preeclampsia; and the need for more frequent OB visits.    We discussed recovery, including length of hospital stay; no new meds other than pain meds on discharge; limitations after surgery (no driving for a couple of weeks; no lifting over 10 lbs or exercise stretching abdominal muscles for 6 weeks; return to work  and fatigue for the first few weeks postdonation).  I informed her of our donor survey results on donors feeling ready to drive, and on return to feeling back to normal.  We also discussed the need for maintaining a healthy lifestyle long-term after donation    We discussed the national GROUNDFLOOR system and the possibility of participating, if not a match for the intended recipient.  I explained how the system worked.    We discussed the increased risk for venous thrombosis for the 1st two postoperative risks.  We discussed that if the family were to drive home in the 1st 2 weeks, the  should stop approximately every 40 minutes and she should get out of the car and walk around for a couple of minutes. At the conclusion of the visit, all questions had been answered and Ms. Reno's candidacy for donation will be reviewed at our Multidisciplinary Donor Selection Committee. She will stay in contact with the nurse coordinator with any concerns.      40 min spent on the date of the encounter in chart review, patient visit,  documentation and/or discussion with other providers about the issues documented above.        Catracho Gorman MD  Surgical Director, Kidney Transplantation                                                                                                      ---------------------------------------------------------------------------------------------------    HPI: Cinthia Reno is a 31 year old year old female who presents for a  kidney donor evaluation.          Past Medical History:   Diagnosis Date     Known health problems: none      Past Surgical History:   Procedure Laterality Date     NO HISTORY OF SURGERY       Family History   Problem Relation Age of Onset     No Known Problems Mother      No Known Problems Father      Kidney Disease Brother      Diabetes Brother      No Known Problems Brother      No Known Problems Brother      Liver Disease Maternal Grandmother      Unknown/Adopted Paternal Grandmother      Unknown/Adopted Paternal Grandfather      No Known Problems Daughter      Social History     Socioeconomic History     Marital status: Single     Spouse name: Not on file     Number of children: Not on file     Years of education: Not on file     Highest education level: Not on file   Occupational History     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   Tobacco Use     Smoking status: Never Smoker     Smokeless tobacco: Never Used   Substance and Sexual Activity     Alcohol use: Not Currently     Drug use: Not Currently     Sexual activity: Not on file   Other Topics Concern     Not on file   Social History Narrative     Not on file     Social Determinants of Health     Financial Resource Strain: Not on file   Food Insecurity: Not on file   Transportation Needs: Not on file   Physical Activity: Not on file   Stress: Not on file   Social Connections: Not on file   Intimate Partner Violence: Not on file   Housing Stability: Not on file       ROS:   CONSTITUTIONAL:  No fevers or chills  EYES: negative for icterus  ENT:  negative for hearing loss, tinnitus and sore throat  RESPIRATORY:  negative for cough, sputum, dyspnea  CARDIOVASCULAR:  negative for chest pain  GASTROINTESTINAL:  negative for nausea, vomiting, diarrhea or constipation  GENITOURINARY:  negative for incontinence, dysuria, bladder emptying problems  HEME:  No easy bruising  INTEGUMENT:  negative for rash and pruritus  NEURO:  Negative for headache,  seizure disorder    Allergies:   No Known Allergies    Medications:      Exam:      There is no height or weight on file to calculate BMI.  Constitutional - A&O in NAD.   Eyes - no redness or discharge.  Sclera anicteric  Respiratory - no cough, no labored breathing  Musculoskeletal - range of motion normal  Skin - no discoloration, no jaundice  Neurological - no tremors.  No facial droop or dysarthria  Psychiatric - normal mood and affect  The rest of a comprehensive physical examination is deferred due to PHE (public health emergency) video visit restrictions     Diagnostics:   Recent Results (from the past 336 hour(s))   Partial thromboplastin time    Collection Time: 01/20/22  7:17 AM   Result Value Ref Range    aPTT 32 22 - 38 Seconds   INR    Collection Time: 01/20/22  7:17 AM   Result Value Ref Range    INR 1.11 0.85 - 1.15   Treponema Abs w Reflex to RPR and Titer    Collection Time: 01/20/22  7:18 AM   Result Value Ref Range    Treponema Antibody Total Nonreactive Nonreactive   HIV Antigen Antibody Combo Pretransplant    Collection Time: 01/20/22  7:18 AM   Result Value Ref Range    HIV Antigen Antibody Combo Pretransplant Nonreactive Nonreactive   Hepatitis B surface antigen    Collection Time: 01/20/22  7:18 AM   Result Value Ref Range    Hepatitis B Surface Antigen Nonreactive Nonreactive   Hepatitis B Surface Antibody    Collection Time: 01/20/22  7:18 AM   Result Value Ref Range    Hepatitis B Surface Antibody 664.32 (H) <8.00 m[IU]/mL   EBV Capsid Antibody IgM    Collection Time: 01/20/22  7:19 AM   Result Value Ref Range    EBV Capsid Ladonna IgM Instrument Value <10.0 <36.0 U/mL    EBV Capsid Antibody IgM No detectable antibody. No detectable antibody.   EBV Capsid Antibody IgG    Collection Time: 01/20/22  7:19 AM   Result Value Ref Range    EBV Capsid Ladonna IgG Instrument Value >750.0 (H) <18.0 U/mL    EBV Capsid Antibody IgG Positive (A) No detectable antibody.   CMV Antibody IgG    Collection Time:  01/20/22  7:19 AM   Result Value Ref Range    CMV Ladonna IgG Instrument Value 7.80 (H) <0.60 U/mL    CMV Antibody IgG Positive, suggests recent or past exposure. (A) No detectable antibody.    HCG quantitative pregnancy    Collection Time: 01/20/22  7:19 AM   Result Value Ref Range    hCG Quantitative <1 0 - 5 IU/L   West Nile Virus Antibody IgG IgM    Collection Time: 01/20/22  7:19 AM   Result Value Ref Range    West Nile IgG Serum 0.31 <=1.29 IV    West Nile IgM Serum 0.02 <=0.89 IV   Hepatitis C antibody    Collection Time: 01/20/22  7:19 AM   Result Value Ref Range    Hepatitis C Antibody Nonreactive Nonreactive   Hepatitis B core antibody    Collection Time: 01/20/22  7:19 AM   Result Value Ref Range    Hepatitis B Core Antibody Total Nonreactive Nonreactive   Phosphorus    Collection Time: 01/20/22  7:19 AM   Result Value Ref Range    Phosphorus 3.6 2.5 - 4.5 mg/dL   Uric acid    Collection Time: 01/20/22  7:19 AM   Result Value Ref Range    Uric Acid 3.3 2.6 - 6.0 mg/dL   Lipid Profile    Collection Time: 01/20/22  7:19 AM   Result Value Ref Range    Cholesterol 160 <200 mg/dL    Triglycerides 76 <150 mg/dL    Direct Measure HDL 50 >=50 mg/dL    LDL Cholesterol Calculated 95 <=100 mg/dL    Non HDL Cholesterol 110 <130 mg/dL    Patient Fasting > 8hrs? Yes    Comprehensive metabolic panel    Collection Time: 01/20/22  7:19 AM   Result Value Ref Range    Sodium 143 133 - 144 mmol/L    Potassium 4.1 3.4 - 5.3 mmol/L    Chloride 111 (H) 94 - 109 mmol/L    Carbon Dioxide (CO2) 23 20 - 32 mmol/L    Anion Gap 9 3 - 14 mmol/L    Urea Nitrogen 14 7 - 30 mg/dL    Creatinine 0.75 0.52 - 1.04 mg/dL    Calcium 8.6 8.5 - 10.1 mg/dL    Glucose 92 70 - 99 mg/dL    Alkaline Phosphatase 55 40 - 150 U/L    AST 14 0 - 45 U/L    ALT 19 0 - 50 U/L    Protein Total 7.4 6.8 - 8.8 g/dL    Albumin 3.8 3.4 - 5.0 g/dL    Bilirubin Total 0.3 0.2 - 1.3 mg/dL    GFR Estimate >90 >60 mL/min/1.73m2   CBC with platelets    Collection Time:  01/20/22  7:20 AM   Result Value Ref Range    WBC Count 7.4 4.0 - 11.0 10e3/uL    RBC Count 4.33 3.80 - 5.20 10e6/uL    Hemoglobin 10.9 (L) 11.7 - 15.7 g/dL    Hematocrit 34.8 (L) 35.0 - 47.0 %    MCV 80 78 - 100 fL    MCH 25.2 (L) 26.5 - 33.0 pg    MCHC 31.3 (L) 31.5 - 36.5 g/dL    RDW 17.2 (H) 10.0 - 15.0 %    Platelet Count 361 150 - 450 10e3/uL   Hemoglobin A1c    Collection Time: 01/20/22  7:20 AM   Result Value Ref Range    Hemoglobin A1C 5.2 0.0 - 5.6 %   Quantiferon TB Gold Plus Grey Tube    Collection Time: 01/20/22  7:20 AM    Specimen: Peripheral Blood   Result Value Ref Range    Quantiferon Nil Tube 0.13 IU/mL   Quantiferon TB Gold Plus Green Tube    Collection Time: 01/20/22  7:20 AM    Specimen: Peripheral Blood   Result Value Ref Range    Quantiferon TB1 Tube 0.06 IU/mL   Quantiferon TB Gold Plus Yellow Tube    Collection Time: 01/20/22  7:20 AM    Specimen: Peripheral Blood   Result Value Ref Range    Quantiferon TB2 Tube 0.07    Quantiferon TB Gold Plus Purple Tube    Collection Time: 01/20/22  7:20 AM    Specimen: Peripheral Blood   Result Value Ref Range    Quantiferon Mitogen 10.00 IU/mL   Adult Type and Screen    Collection Time: 01/20/22  7:20 AM   Result Value Ref Range    ABO/RH(D) O POS     Antibody Screen Negative Negative    SPECIMEN EXPIRATION DATE [id number]    Quantiferon TB Gold Plus    Collection Time: 01/20/22  7:20 AM    Specimen: Peripheral Blood   Result Value Ref Range    Quantiferon-TB Gold Plus Negative Negative    TB1 Ag minus Nil Value -0.07 IU/mL    TB2 Ag minus Nil Value -0.06 IU/mL    Mitogen minus Nil Result 9.87 IU/mL    Nil Result 0.13 IU/mL   ABO and Rh 2nd type and screen required    Collection Time: 01/20/22  7:44 AM   Result Value Ref Range    ABO/RH(D) O POS     SPECIMEN EXPIRATION DATE [id number]    HLA-ABC Typing High Resolution    Collection Time: 01/20/22  9:26 AM   Result Value Ref Range    ABTEST METHOD NGS     hirmadelinA-1 A*02:06     Nehemiah-1Equiv 2      hiresA-2 A*31:18     hiresA-2Equiv 31     hiresB-1 B*27:05     hiresB-1Equiv 27     hiresB-2 B*35:01     hiresB-2Equiv 35     hiresC-1 C*02:02     hiresC-1Equiv 2     hiresC-2 C*04:04     hiresC-2Equiv 4     hiresBw-1 Bw*4     hiresBw-2 Bw*6    HLA-DR Typing High Resolution    Collection Time: 01/20/22  9:26 AM   Result Value Ref Range    DRhiresTestM NGS     hiresDPA1-1 DPA1*01:03     hiresDPB1-1 DPB1*02:01     hiresDPB1-1N AFCTE 02:01/416:01     hiresDPB1-2 DPB1*04:02     hiresDPB1-2N FNVS 04:02/105:01     hiresDQA1-1 DQA1*03:01     hiresDQA1-2 DQA1*05:03     hiresDQB1-1 DQB1*03:01     hiresDQB1-1Equiv 7     hiresDQB1-2 DQB1*03:02     hiresDQB1-2Equiv 8     hiresDRB1-1 DRB1*04:07     hiresDRB1-1Equiv 4     hiresDRB1-2 DRB1*14:02     hiresDRB1-2Equiv 14     hiresDRB3-1 DRB3*01:01     hiresDRB3-1Equiv 52     hiresDRB4-2 DRB4*01:03     hiresDRB4-2Equiv 53    Iohexol 1st Order    Collection Time: 01/20/22  9:43 AM   Result Value Ref Range    Iohexol Time 1 5.53 mg/dL    Iohexol Time 2 3.11 mg/dL    Iohexol Body Surface Area 1.729 m2    Iohexol Raw Clearance 127 mL/min    Iohexol Std Clearance 127 /1.73 m2   Protein  random urine    Collection Time: 01/20/22 12:45 PM   Result Value Ref Range    Total Protein Random Urine g/L <0.05 g/L    Total Protein Urine g/gr Creatinine      Creatinine Urine mg/dL 50 mg/dL   Albumin Random Urine Quantitative with Creat Ratio    Collection Time: 01/20/22 12:45 PM   Result Value Ref Range    Creatinine Urine mg/dL 50 mg/dL    Albumin Urine mg/L <5 mg/L    Albumin Urine mg/g Cr     Routine UA with microscopic    Collection Time: 01/20/22 12:45 PM   Result Value Ref Range    Color Urine Straw Colorless, Straw, Light Yellow, Yellow    Appearance Urine Clear Clear    Glucose Urine Negative Negative mg/dL    Bilirubin Urine Negative Negative    Ketones Urine Negative Negative mg/dL    Specific Gravity Urine 1.020 1.003 - 1.035    Blood Urine Negative Negative    pH Urine 6.0 5.0 - 7.0     Protein Albumin Urine Negative Negative mg/dL    Urobilinogen Urine Normal Normal, 2.0 mg/dL    Nitrite Urine Negative Negative    Leukocyte Esterase Urine Negative Negative    Mucus Urine Present (A) None Seen /LPF    RBC Urine <1 <=2 /HPF    WBC Urine 1 <=5 /HPF    Squamous Epithelials Urine <1 <=1 /HPF   EKG 12-lead complete w/read - Clinics    Collection Time: 01/20/22  1:08 PM   Result Value Ref Range    Systolic Blood Pressure  mmHg    Diastolic Blood Pressure  mmHg    Ventricular Rate 63 BPM    Atrial Rate 63 BPM    DE Interval 130 ms    QRS Duration 84 ms     ms    QTc 405 ms    P Axis 25 degrees    R AXIS 6 degrees    T Axis 49 degrees    Interpretation ECG       Sinus rhythm  Normal ECG  No previous ECGs available  Confirmed by MD TONIE, MONET ([id number]) on 1/21/2022 12:00:55 AM             Again, thank you for allowing me to participate in the care of your patient.        Sincerely,        Catracho Gorman MD

## 2022-01-20 NOTE — LETTER
Date:February 2, 2022      Patient was self referred, no letter generated. Do not send.        [facility] Health Information

## 2022-01-20 NOTE — PROGRESS NOTES
[facility] Solid Organ Transplant  Outpatient MNT: Kidney Donor Evaluation    Current BMI: 23.7 (HT 65 in,  lbs/65 kg)  BMI is within recommendation of <30 for kidney donation    8 Year Estimated Risk of T2DM  </= 3%     Time Spent: 15 minutes  Visit Type: Initial  Referring Physician: Vita   Pt accompanied by: self     Nutrition Assessment  Vitamins, Supplements, Pertinent Meds: MVI   Herbal Medicines/Supplements: none     Weight hx: stable     Food Security: any concerns about having enough money to buy food or access to grocery stores? No     Diet Recall  Breakfast At school- cold cereal, villasenor/eggs/toast    Lunch At school- tater tot casserole, taco salad   Dinner Spaghetti; homemade soup    Snacks Occasional pretzels or chips    Beverages Coffee, water, Red Bull a few/week    Alcohol Very rare- 2/year    Dining out 2x/week, trying to reduce this     Physical Activity  Exercises 2x/week - running or leg lifts      Labs  Recent Labs   Lab Test 01/20/22  0719   CHOL 160   HDL 50   LDL 95   TRIG 76       FBG = 92  A1c = 5.2  BP = wnl x 3     Prediction of Incident Diabetes Mellitus in Middle-aged Adults: The [place] Offspring Study  Angelito Dupree MD; James B. Meigs, MD, MPH; Sahara Torres, PhD; Marisa Flores MD, MPH; Barrett Robin MD; Hal Langley Sr,   PhD  Pt's estimated risk for T2DM (per Table 6 above)  Pt received points for the following criteria: none   Total points: 0  8-Year estimated risk of T2DM: </= 3%    Nutrition Diagnosis  No nutrition diagnosis identified at this time.    Nutrition Intervention  Nutrition education provided:  Reviewed overall healthy diet guidelines for pre and post kidney donation. Discussed maintenance of a healthy weight and Na+ intake <3000 mg/day (<2000 mg/day if HTN).    Avoid the following post op d/t unknown effects on the organs:  - Herbal, Chinese, holistic, chiropractic, natural, alternative medicines and supplements  - Detoxes and  cleanses  - Weight loss pills  - Protein powders or other products with extracts or herbs (ie green tea extract)    Patient Understanding: Pt verbalized understanding of education provided.  Expected Engagement: Good  Follow-Up Plans: PRN     Nutrition Goals  No nutrition goals identified at this time     Tete Marino, RD, LD, CCTD

## 2022-01-20 NOTE — PROGRESS NOTES
Iohexol Timed Test Nursing Note    Cinthia Reno comes to [facility] today for a Iohexol GFR Timed test.   Orders from Dr Leigh were completed.    Progress Note  Drug Administration Record    Drug Name: iohexol  Dose: 5 ml  Route Administered: IV  NDC#: 0407-1413-10  Amount of waste (mL): 5 ml  Reason for waste: single use vial    Administrations This Visit     iohexol (OMNIPAQUE 300) injection 5 mL     Admin Date  01/20/2022 Action  Given Dose  5 mL Route  Intravenous Administered By  Abby Baez RN                    The following information was verified with the patient:    *Female patients are not pregnant or could not have become recently pregnant: YES, No  *Is there a history of allergy (skin rash, swelling, ect) to :   a. Iodine (except skin reactions to Betadine): NO   b. Intravenous radio-contrast agents: NO   c. Seafood: NO     RN provided patient with educational handout regarding timed test. YES    Medication administered :   Iohexol (Omnipaque 300 mg Iodine/ ml concentration) 5 mls.  Site administered LUE  Start [id number]  Stop [id number]    Blood draws to be collected by transplant nurse.       Patient tolerated the procedure well      Discharge Plan    Discharge instructions reviewed with patient: YES  Discharge papers printed and given to patient: YES  Patient/Representative verbalized understanding, all questions answered: YES        Abby Baez RN

## 2022-01-20 NOTE — NURSING NOTE
Chief Complaint   Patient presents with     Infusion     iohexol     Blood Draw     IV placed, blood drawn, checked into next appointment     Labs drawn via IV placed by Dara Leigh MA   in right arm.    Dara Leigh MA

## 2022-01-20 NOTE — PROGRESS NOTES
Psychosocial Evaluation  Living Organ Donation per OPTN Policy 14.1.A  Organ Type: unrelated, kidney  Presenting Information:  Ms. Cinthia Reno presents to the [facility] to complete a psychosocial evaluation since she is interested in becoming a kidney donor for her half brother, Mr.Logan Conde, age 18.    PERSONAL BACKGROUND:  Current Living Situation: Cinthia lives with her boyfriend in [place], [place].    Education/Employment/Financial Situation: Cinthia is in [employer].  She is of the [employer].  She is working on her bachelor's degree in social work.  She is able to obtain her bachelor's degree at the [employer].  Right now it is all distance learning.  She has one year left.  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  Her  is a mentor a high school.  He is also working toward his bachelor's degree in business administration.      Health Insurance Status: Cinthia has dental and vision, but not health care insurance.  She uses S, which is free for her since she is a member of the [employer] where she lives in [place].    Family History: Cinthia and her boyfriend have been together 3 years.  No children. Cinthia has a 12 year old from a previous relationship.  She is close to her mother, but not her father.  She has 2 full siblings and 2 half siblings.    General Health: Cinthia does not have a HCD at this time.  She does have a primary care clinic at the [facility] in [place], [place].    Mental Health: Cinthia saw a counselor for about 3 years, and stopped in summer of 2021.  She was seeing her for an adjustment disorder and life stressors.  The donor denies any past or present treatment for mental health issues, such as anxiety, depression, bipolar disorder, or disorders of thought such as schizophrenia or schizoaffective  disorder.  Cinthia was doing some self harm behaviors prior to having her daughter, about 13 years ago.  She got spiritual help from her [place] community, but not traditional Western professional mental health help.  There is no history of personality disorder or eating disorders.  The donor denies any need to see a counselor or therapist at this time.  The donor denies any past suicidal ideation, plans, or past attempts.  The donor denies any use of psychotropic medications at this time or in the past.  The donor denies any past history of hospitalization for psychiatric illnesses.  The donor denies any past history of ADHD or ADD.  The donor denies any history of educational issues or need for special educational services in their past history.      Alcohol and Drug Use/Abuse/Dependency: Cinthia reports that she consumes approximately 3 to 4 servings of alcohol per year ( a serving is defined here as one, 12 oz beer, or one 4 oz glass of wine, or one 1 /2 oz of hard liquor).  The donor denies any past history of abuse or dependency on alcohol or illicit drugs. The donor denies any current use of street drugs, including marijuana, vaping, edible marijuana, or other mood altering substances.  The donor denies any past history of negative consequences of use of alcohol or drugs such as a DUI, relationship problems, problems with fulfilling parenting or other care giving responsibilities, or problems with work performance.       Cigarette Use: Cinthia smoked some when she was right out of high school, but not since.    Legal: no legal issues    Coping with major surgery/associated stress: Cinthia likes to stay home and spend time with her family.  She likes to go hiking.  She likes to go to the park.    Support System: Cinthia relies on her boyfriend, her mom, and her therapist for support.  She can reach out to her therapist again if she ever wants to set up an appointmtnet.      DONOR SPECIFIC  INFORMATION:  Relationship to Recipient: Cinthia wants to donate to her brother, 1/2 brother (same mom, different dad), Gold Conde, age 18. She is not sure what is causing his kidney disease.  This will be his second transplant.  His first was about 3 years ago from a  donor.     Decision Process/Motivation to Donate: Cinthia reports she has been thinking about this for several years.  She is going to school to be a  and wants to help.  Cinthia denies any pressure or coercion to be a donor.  She denies any offer of payment to be a donor.    High risk behaviors as defined by [place] Public Health Services (PHS) that have potential to increase risk of disease transmission were reviewed and and she denies any high risk behaviors.     PREPARATION FOR DONATION, RECOVERY, AND POTENTIAL SHORT-LONG-TERM OUTCOMES:  Understanding of the Living Donation Process:  We discussed the role of Independent Living Donor Advocate.  Short and long term medical and psychosocial risks to both, donor and recipient were reviewed and she is able to voice these risks.  Post surgical restrictions (2 weeks no driving, 6 weeks no lifting over 10 lbs) were reviewed and she appears capable of adhering to the post surgical requirements. The need for a caregiver was discussed and she has her boyfriend to help her post surgery .  The risk of poor psychosocial outcome including problems with body image, post-surgery depression or anxiety, or feelings of emotional distress or bereavement if recipient experiences any recurrent disease, poor outcome or death was reviewed.  Additionally, potential financial implications, including the risk of having difficulty obtaining health care insurance, life insurance, disability insurance, or long term care insurance were reviewed, as were available donor grants to assist with donor related expenses.      We also discussed some unique issues that arise with paired kidney donation, which  include the uncertainty of the timing and the importance of having a employment situation and support system that is able to provide sustained support and flexibility.    Ms. Reno appears capable of understanding this information and making an informed medical decision.    Impressions/Recommendations:   Ms. Cinthia Reno  is highly motivated to donate a kidney  to her half brother, Mr.Logan Conde, age 18.  Her decision to donate is free of inducement, coercion, or other undue pressure.   Her housing, finances and employment are stable.  No current/active mental health or chemical abuse issues were identified.  The need for a caregiver was reviewed and she is able to identify a plan to meet her post operative care needs.  She appears capable of making an informed medical decision.  No psychosocial contraindications to living organ donation were identified and  I support Ms. Reno s desire to donate a kidney to her half brother, Mr.Logan Conde, age 18.         Contact Information:   HELENA Tan, [facility]  Living Donor   [facility]  Pager:  [phone]  Direct:  [phone] Cell Phone  E-Mail:  [email or web address]      Time Spent: 40 minutes

## 2022-01-20 NOTE — LETTER
Date:January 25, 2022      Provider requested that no letter be sent. Do not send.       [facility]

## 2022-01-20 NOTE — PROGRESS NOTES
TRANSPLANT NEPHROLOGY DONOR EVALUATION    Assessment and Plan:  # Prospective Kidney Transplant Donor: Patient with one issue that need to be addressed prior to donation. Patient's blood pressure is acceptable at this visit, kidney function appears to be acceptable with Iohexol pending, and urinalysis is bland.    # Anemia: new onset, unclear in origin, may be related to heavy cycles. Will repeat HGB and will check iron stores   # Pregnancy counseling: we discussed the risk of preeclampsia before and after donation.     Discussed the risks of donating a kidney, including the surgical risk and the possible risks of living with one kidney.    Education about expected post-donation kidney function and how chronic kidney disease (CKD) and end stage kidney disease (ESKD) might potentially impact the donor in the future, include, but not limited to:       - On average, donors will have 25-35% permanent loss of kidney function at donation.       - Baseline risk of ESKD may slightly exceed that of members of the general population with the same demographic profile.       - Donor risks must be interpreted in light of known epidemiology of both CKD or ESKD, such as that CKD generally develops in midlife (40-50 years old) and ESKD generally develops after age 60.       - Medical evaluation of young potential donors cannot predict lifetime risk of CKD or ESKD.       - Donors may be at higher risk for CKD if they sustain damage to the remaining kidney.       - Development of CKD and progression of ESKD may be more rapid with only 1 kidney.       - Some type of kidney replacement therapy, either kidney transplant or dialysis, is required when reaching ESKD.    Potential medical or surgical risks include, but not limited to:       - Death.       - Scars, pain, fatigue, and other consequences typical of any surgical procedure.       - Decreased kidney function.       - Abdominal or bowel symptoms, such as bloating and nausea, and  developing bowel obstruction.       - Kidney failure (ESKD) and the need for a kidney transplant or dialysis for the donor.       - Impact of obesity, hypertension, or other donor-specific medical conditions on morbidity and mortality of the potential donor.    Patients overall evaluation will be discussed with the transplant team and a final recommendation on the patients' suitability to be a kidney transplant donor will be made at that time.    Consult:  Cinthia Reno was seen in consultation at the request of Dr. Catracho Gorman for evaluation as a potential kidney transplant donor.    Reason for Visit:  Cinthia Reno is a 31 year old female who presents for a kidney donor evaluation.  Patient would like to donate to brother..    Present Condition and Donor-Related Medical History:      Cinthia is a delightful 31 year old who is interested in donating a kidney to her brother (half brother). She is generally healthy. She works full time. She was accompanied by her boy friend to the visit. We discussed the recent drop in her hemoglobin. She did not recall any specific reason but did report heavier cycles at times but nothing out of the ordinary.          Kidney Disease Hx:       h/o Kidney Problems: No  Family h/o Genetic Kidney Disease: No       h/o Hypertension: No    Usual Blood Pressure: wnl       h/o Protein in Urine: No  h/o Blood in Urine: No       h/o Kidney Stones: No  h/o Kidney Injury: No       h/o Recurrent UTI: No  h/o Genitourinary Problems: No       h/o Chronic NSAID Use: No         Other Medical Hx:       h/o Diabetes: No             h/o Gastrointestinal, Pancreas or Liver Problems: No       h/o Lung or Heart Problems: No       h/o Hematologic Problems: No  h/o Bleeding or Clotting Problems: No       h/o Cancer: No       h/o Infection Problems: No       h/o Gestational DM: No      h/o Preeclampsia: No         Skin Cancer Risk:       h/o more than 50 moles: No       h/o extensive sun exposure:  No       h/o melanoma: No       Family h/o melanoma: No         Mental Health Assessment:       h/o Depression: No       h/o Psychiatric Illness: No       h/o Suicidal Attempt(s): No    COVID Status:  Vaccination Up To Date: Yes  H/o COVID Infection: No     Review Of Systems:   A comprehensive review of systems was obtained and negative, except as noted in the HPI or PMH.    Past Medical History:   History was taken from the patient as noted below.  Past Medical History:   Diagnosis Date     Known health problems: none        Past Social History:   Past Surgical History:   Procedure Laterality Date     NO HISTORY OF SURGERY       Personal or family history of anesthesia problems: No    Family History:   Family History   Problem Relation Age of Onset     No Known Problems Mother      No Known Problems Father      Kidney Disease Brother      Diabetes Brother      No Known Problems Brother      No Known Problems Brother      Liver Disease Maternal Grandmother      Unknown/Adopted Paternal Grandmother      Unknown/Adopted Paternal Grandfather      No Known Problems Daughter           Specific Family History in First Degree Relatives:       FH of Kidney Dz: yes(1/2 brother) FH of Diabetes: Yes        FH of Hypertension: Yes   FH of CAD: No       FH of Cancer: No  FH of Kidney Cancer: No    Personal History:   Social History     Socioeconomic History     Marital status: Single     Spouse name: Not on file     Number of children: Not on file     Years of education: Not on file     Highest education level: Not on file   Occupational History     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   Tobacco Use     Smoking status: Never Smoker     Smokeless tobacco: Never Used   Substance and Sexual Activity     Alcohol use: Not Currently     Drug use: Not Currently     Sexual activity: Not on file   Other Topics Concern     Not on file   Social History Narrative     Not on file     Social Determinants of Health  "    Financial Resource Strain: Not on file   Food Insecurity: Not on file   Transportation Needs: Not on file   Physical Activity: Not on file   Stress: Not on file   Social Connections: Not on file   Intimate Partner Violence: Not on file   Housing Stability: Not on file          Specific Social History:       Health Insurance Status: Yes (S)       Employment Status: Full time  Occupation: Social work                   Living Arrangements: lives with an adult        Social Support: Yes       Presence of increased risk for disease transmission behaviors as defined by PHS guidelines: No        Allergies:  No Known Allergies    Medications:  No current outpatient medications on file.     No current facility-administered medications for this visit.     There are no discontinued medications.      Vitals:  Vital Signs 1/20/2022 1/20/2022 1/20/2022   Systolic 101 108 103   Diastolic 66 72 69   Pulse 74 - -   Weight (LB) 142 lb 8 oz - -   Height 5' 5\" - -   BMI (Calculated) 23.71 - -   O2 100 - -       Exam:   GENERAL APPEARANCE: alert and no distress  HENT: mouth without ulcers or lesions  LYMPHATICS: no cervical or supraclavicular nodes  RESP: lungs clear to auscultation - no rales, rhonchi or wheezes  CV: regular rhythm, normal rate, no rub, no murmur  EDEMA: no LE edema bilaterally  ABDOMEN: soft, nondistended, nontender, bowel sounds normal  MS: extremities normal - no gross deformities noted, no evidence of inflammation in joints, no muscle tenderness  SKIN: no rash    Results:   Labs and imaging were ordered for this visit and reviewed by me.  Recent Results (from the past 336 hour(s))   Partial thromboplastin time    Collection Time: 01/20/22  7:17 AM   Result Value Ref Range    aPTT 32 22 - 38 Seconds   INR    Collection Time: 01/20/22  7:17 AM   Result Value Ref Range    INR 1.11 0.85 - 1.15   HCG quantitative pregnancy    Collection Time: 01/20/22  7:19 AM   Result Value Ref Range    hCG Quantitative " <1 0 - 5 IU/L   Phosphorus    Collection Time: 01/20/22  7:19 AM   Result Value Ref Range    Phosphorus 3.6 2.5 - 4.5 mg/dL   Uric acid    Collection Time: 01/20/22  7:19 AM   Result Value Ref Range    Uric Acid 3.3 2.6 - 6.0 mg/dL   Lipid Profile    Collection Time: 01/20/22  7:19 AM   Result Value Ref Range    Cholesterol 160 <200 mg/dL    Triglycerides 76 <150 mg/dL    Direct Measure HDL 50 >=50 mg/dL    LDL Cholesterol Calculated 95 <=100 mg/dL    Non HDL Cholesterol 110 <130 mg/dL    Patient Fasting > 8hrs? Yes    Comprehensive metabolic panel    Collection Time: 01/20/22  7:19 AM   Result Value Ref Range    Sodium 143 133 - 144 mmol/L    Potassium 4.1 3.4 - 5.3 mmol/L    Chloride 111 (H) 94 - 109 mmol/L    Carbon Dioxide (CO2) 23 20 - 32 mmol/L    Anion Gap 9 3 - 14 mmol/L    Urea Nitrogen 14 7 - 30 mg/dL    Creatinine 0.75 0.52 - 1.04 mg/dL    Calcium 8.6 8.5 - 10.1 mg/dL    Glucose 92 70 - 99 mg/dL    Alkaline Phosphatase 55 40 - 150 U/L    AST 14 0 - 45 U/L    ALT 19 0 - 50 U/L    Protein Total 7.4 6.8 - 8.8 g/dL    Albumin 3.8 3.4 - 5.0 g/dL    Bilirubin Total 0.3 0.2 - 1.3 mg/dL    GFR Estimate >90 >60 mL/min/1.73m2   CBC with platelets    Collection Time: 01/20/22  7:20 AM   Result Value Ref Range    WBC Count 7.4 4.0 - 11.0 10e3/uL    RBC Count 4.33 3.80 - 5.20 10e6/uL    Hemoglobin 10.9 (L) 11.7 - 15.7 g/dL    Hematocrit 34.8 (L) 35.0 - 47.0 %    MCV 80 78 - 100 fL    MCH 25.2 (L) 26.5 - 33.0 pg    MCHC 31.3 (L) 31.5 - 36.5 g/dL    RDW 17.2 (H) 10.0 - 15.0 %    Platelet Count 361 150 - 450 10e3/uL   Hemoglobin A1c    Collection Time: 01/20/22  7:20 AM   Result Value Ref Range    Hemoglobin A1C 5.2 0.0 - 5.6 %   Adult Type and Screen    Collection Time: 01/20/22  7:20 AM   Result Value Ref Range    ABO/RH(D) O POS     Antibody Screen Negative Negative    SPECIMEN EXPIRATION DATE [id number]

## 2022-01-20 NOTE — LETTER
1/20/2022         RE: Cinthia Reno  [address]  [place] [address]        Dear Colleague,    Thank you for referring your patient, Cinthia Reno, to the [facility]. Please see a copy of my visit note below.    Iohexol Timed Test Nursing Note    Cinthia Reno comes to [facility] today for a Iohexol GFR Timed test.   Orders from Dr Leigh were completed.    Progress Note  Drug Administration Record    Drug Name: iohexol  Dose: 5 ml  Route Administered: IV  NDC#: 0407-1413-10  Amount of waste (mL): 5 ml  Reason for waste: single use vial    Administrations This Visit     iohexol (OMNIPAQUE 300) injection 5 mL     Admin Date  01/20/2022 Action  Given Dose  5 mL Route  Intravenous Administered By  Abby Baez, RN                    The following information was verified with the patient:    *Female patients are not pregnant or could not have become recently pregnant: YES, No  *Is there a history of allergy (skin rash, swelling, ect) to :   a. Iodine (except skin reactions to Betadine): NO   b. Intravenous radio-contrast agents: NO   c. Seafood: NO     RN provided patient with educational handout regarding timed test. YES    Medication administered :   Iohexol (Omnipaque 300 mg Iodine/ ml concentration) 5 mls.  Site administered LUE  Start [id number]  Stop [id number]    Blood draws to be collected by transplant nurse.     Patient tolerated the procedure well    Discharge Plan    Discharge instructions reviewed with patient: YES  Discharge papers printed and given to patient: YES  Patient/Representative verbalized understanding, all questions answered: YES    Abby Baez RN          Again, thank you for allowing me to participate in the care of your patient.      Sincerely,    [facility]

## 2022-01-20 NOTE — LETTER
1/20/2022         RE: Cinthia Reno  [address]  [place] [address]        Dear Colleague,    Thank you for referring your patient, Cinthia Reno, to the [facility]. Please see a copy of my visit note below.    Living Kidney Donor Consent per OPTN Policy 14.2 for Independent Living Donor Advocate (CHEN)    Organ Type: Related Kidney Donor  Presenting Information:  Carmencita Reno presents to [facility] to complete a living donor evaluation since she is interested in becoming a kidney donor for her little brother, Gold Conde.  She is here today with her boyfriend.  .      Written assurance has been obtained from the potential donor that he/she:   Is willing to donate  Is free from inducement and coercion  Has been informed that the he/she may decline to donate at any time  Has been informed that transplant centers must:   A) Offer donors an opportunity to discontinue the donor consent or evaluation process in a way that is protected and confidential  B) Provide an independent living donor advocate (CHEN) to assist the potential donor during this process    The following was presented to the potential donor in a language in which the potential donor is able to engage in meaningful dialogue:   Education and instruction about all phases of the living donation process including:   Consent  Medical and psychosocial evaluation  Information about the surgical procedure  Pre and post operative care  Benefits of post operative follow up  Disclosure that the recovery hospital will take all reasonable precautions to provide confidentiality for the donor/recipient  Disclosure that it is a federal crime for any person to knowingly acquire, obtain or otherwise transfer any human organ for valuable consideration  Disclosure that the [facility] hospital must provide an independent living donor  advocate (CHEN)  Disclosure that health information obtained during the evaluation is subject to the same regulations as all records and could reveal conditions that must be reported to local, state, or federal public health authorities  Disclosure that the [facility] is required to report living donor follow up information at 6 months, 1 year, and 2 years, and that the potential donor must commit to post operative follow up testing coordinated by the [facility]    Disclosure has been provided that these risks may be transient or permanent & include but are not limited to:  Potential psychosocial risks:  Problems with body image  Post-surgery depression or anxiety  Feelings of emotional distress or bereavement if recipient experiences any recurrent disease or in the event of the recipient s death  Impact of donation on the donor s lifestyle, such as limited ability to exercise in the short term post operative recovery period, no driving for the first 2 weeks post op or until the donor is no longer needing pain medications that impair the ability to drive.      Potential financial impacts:  Personal expenses of travel, housing, , lost wages related to donation might not be reimbursed. However, resources may be available to defray some donation-related expenses.   Need for life-long follow up at the donor s expense  Loss of employment or income  Negative impact on the ability to obtain future employment  Negative impact on the ability to obtain, maintain, or afford health, disability, and life insurance  Future health problems experienced by living donors following donation may not be covered by the recipient s insurance      PREPARATION FOR DONATION, RECOVERY, AND POTENTIAL SHORT-LONG-TERM OUTCOMES:  Understanding of the Living Donation Process:  We discussed the role of Independent Living Donor Advocate.  Short and long term medical and psychosocial risks to both, donor and recipient were  reviewed and she appears capable of understanding the risks.  Post surgical restrictions (2 weeks no driving, 6 weeks no lifting over 10 lbs) were reviewed and she appears capable of adhering to the post surgical requirements. The need for a caregiver was discussed and her boyfriend states he is able to help care for her .  The risk of poor psychosocial outcome including problems with body image, post-surgery depression or anxiety, or feelings of emotional distress or bereavement if recipient experiences any recurrent disease, poor outcome or death was reviewed.  Additionally, potential financial implications, including the risk of having difficulty obtaining health care insurance, life insurance, disability insurance, or long term care insurance were reviewed, as were available donor grants to assist with donor related expenses.      Impressions/Recommendations:  Cinthia appears highly motivated to donate a kidney to her little brother.  She denies pressure, inducement or coercion.  She turned her NLDAC paperwork in to [facility] today and would benefit from travel and wage reimbursement if she comes back for donation.    She appears capable of understanding this information and making an informed medical decision.  As CHEN, no contraindications were identified.  She is aware that I am available throughout all phases of the donor evaluation.    Contact Information:  HELENA ZAIDI, OPHELIA   Independent Donor Advocate  [facility]  Phone - [phone]  Pager - [phone]  [email or web address]      Time Spent: 30 minutes        Again, thank you for allowing me to participate in the care of your patient.        Sincerely,        OPHELIA Yadav

## 2022-01-21 LAB
ATRIAL RATE - MUSE: 63 BPM
DIASTOLIC BLOOD PRESSURE - MUSE: NORMAL MMHG
GAMMA INTERFERON BACKGROUND BLD IA-ACNC: 0.13 IU/ML
INTERPRETATION ECG - MUSE: NORMAL
M TB IFN-G BLD-IMP: NEGATIVE
M TB IFN-G CD4+ BCKGRND COR BLD-ACNC: 9.87 IU/ML
MITOGEN IGNF BCKGRD COR BLD-ACNC: -0.06 IU/ML
MITOGEN IGNF BCKGRD COR BLD-ACNC: -0.07 IU/ML
P AXIS - MUSE: 25 DEGREES
PR INTERVAL - MUSE: 130 MS
QRS DURATION - MUSE: 84 MS
QT - MUSE: 396 MS
QTC - MUSE: 405 MS
QUANTIFERON MITOGEN: 10 IU/ML
QUANTIFERON NIL TUBE: 0.13 IU/ML
QUANTIFERON TB1 TUBE: 0.06 IU/ML
QUANTIFERON TB2 TUBE: 0.07
R AXIS - MUSE: 6 DEGREES
SYSTOLIC BLOOD PRESSURE - MUSE: NORMAL MMHG
T AXIS - MUSE: 49 DEGREES
VENTRICULAR RATE- MUSE: 63 BPM
WNV IGG SER IA-ACNC: 0.31 IV
WNV IGM SER IA-ACNC: 0.02 IV

## 2022-01-25 ENCOUNTER — DOCUMENTATION ONLY (OUTPATIENT)
Dept: TRANSPLANT | Facility: CLINIC | Age: 32
End: 2022-01-25

## 2022-01-25 LAB
A*: NORMAL
A*LOCUS SEROLOGIC EQUIVALENT: 2
A*LOCUS: NORMAL
A*SEROLOGIC EQUIVALENT: 31
ABTEST METHOD: NORMAL
B*: NORMAL
B*LOCUS SEROLOGIC EQUIVALENT: 27
B*LOCUS: NORMAL
B*SEROLOGIC EQUIVALENT: 35
BSA: 1.73 M2
BW-1: NORMAL
BW-2: NORMAL
C*: NORMAL
C*LOCUS SEROLOGIC EQUIVALENT: 2
C*LOCUS: NORMAL
C*SEROLOGIC EQUIVALENT: 4
DPA1*: NORMAL
DPB1*: NORMAL
DPB1*LOCUS NMDP: NORMAL
DPB1*LOCUS: NORMAL
DPB1*NMDP: NORMAL
DQA1*: NORMAL
DQA1*LOCUS: NORMAL
DQB1*: NORMAL
DQB1*LOCUS SEROLOGIC EQUIVALENT: 7
DQB1*LOCUS: NORMAL
DQB1*SEROLOGIC EQUIVALENT: 8
DRB1*: NORMAL
DRB1*LOCUS SEROLOGIC EQUIVALENT: 4
DRB1*LOCUS: NORMAL
DRB1*SEROLOGIC EQUIVALENT: 14
DRB3*LOCUS SEROLOGIC EQUIVALENT: 52
DRB3*LOCUS: NORMAL
DRB4*: NORMAL
DRB4*SEROLOGIC EQUIVALENT: 53
DRSSO TEST METHOD: NORMAL
IOHEXOL CL UR+SERPL-VRATE: 127 /1.73 M2
IOHEXOL CL UR+SERPL-VRATE: 127 ML/MIN
IOHEXOL CL UR+SERPL-VRATE: 3.11 MG/DL
IOHEXOL CL UR+SERPL-VRATE: 5.53 MG/DL

## 2022-01-25 NOTE — PROGRESS NOTES
Image Review Meeting    ATTENDEES: Meredith Khalil, Nicky Uriarte, Dee Soto, Aspen Trujillo, Mandy Flores, Olga Salcido, Dionne Carrion    IMAGES REVIEWED: CTA renal from 1/20/22    Impression:      1.   1a. The right kidney contains a single renal artery, a single renal  vein, and a single ureter.  1b. The right kidney parenchyma is normal. The renal volume is 137.5  cc.     2.   2a. The left kidney contains a single renal artery, a single renal  vein, and a single ureter.  2b. The left kidney parenchyma is normal The renal volume is 153.0 cc.    DECISION: LEFT WITH RIGHT AVAILABLE     INCIDENTALS: No

## 2022-01-26 ENCOUNTER — TELEPHONE (OUTPATIENT)
Dept: TRANSPLANT | Facility: CLINIC | Age: 32
End: 2022-01-26

## 2022-01-26 ENCOUNTER — COMMITTEE REVIEW (OUTPATIENT)
Dept: TRANSPLANT | Facility: CLINIC | Age: 32
End: 2022-01-26

## 2022-01-26 NOTE — COMMITTEE REVIEW
Living Donor Committee Review Note Evaluation Date: 1/20/2022  Committee Review Date: 1/26/2022    Donor being evaluated for: Kidney    Transplant Phase: Evaluation  Transplant Status: Active    Transplant Coordinator: Fabiola Hi  Transplant Surgeon:       Committee Review Members:  Immunology Barrett Ken, PhD, Cassie Soto   Nephrology Juan R Mcbride MD, Chandni Martinez MD, Connor Arellano MD   Nutrition Tete Marino, JOSE EDUARDO   Pharmacist Home Orellana, [facility], Alansi Georges, [facility]    - Clinical Tiffany Rodriguez, [facility], Alena Rojas, [facility]   Transplant Genna Latesha Recio, RN, Sommer Faust, NP, Dionne Carrion, RN, Janet Barragan LPN, Nitza Loredo, JANE, Dee Soto, JANE, Fabiola Kinney, RN   Transplant Surgery Parish Miranda MD, Fay Khalil MD, MD       Transplant Eligibility: Acceptable Physical Health, Acceptable Mental Health    Committee Review Decision: Needs Re-presentation    Relative Contraindications: None    Absolute Contraindications: None    Committee Chair Parish Miranda MD verbally attested to the committee's decision.    Committee Discussion Details:   1. Needs to repeat hgb-heavy cycles   2. Need to complete iron studies   3. Start iron supplements  4. Talk about pregnancy post donation

## 2022-01-26 NOTE — LETTER
REIMBURSEMENT INFORMATION FOR LIVING ORGAN DONORS    LIVING ORGAN DONOR: This form MUST accompany & remain attached to Orders &  given to Provider and/or Healthcare Facility Business Office    PROVIDER/FACILITY INSTRUCTIONS: By accepting to perform these services for living organ  donation, the provider/facility agrees to exclusively bill the [facility] instead of billing  the patient or any insurance provider and agrees to accept the reimbursement, as described below, as  payment in full for services rendered.    PROVIDER BILLING INSTRUCTIONS:  1. [facility] agrees to pay for all authorized testing ordered by our transplant  program that is related to living organ donation. The attached orders/tests are part of the donor  Evaluation.    2. Do not bill the donor or donor's insurance. Send an itemized invoice, claim or statement to:    [facility]  Transplant Finance/Donor Billing  [address] [place] [address]    3. Billing statements must include the patient first and last name, date of birth, the CPT procedure code  and date of service. Please bill service on the ORIGINAL UBO4 or 1500 with appropriate CPT/HCPCS  codes along with W-9 and send to the above address to insure timely reimbursement.    4. Claims should be submitted no later than six months from the date when services are rendered.  Claims denied for late submission should not be billed to the donor or their private insurance carrier.    5. [facility] will reimburse all charges at 100% of the Medicare Fee Schedule as  defined in the Code of Federal Regulations (CFR) 42, Chapter IV. This is to be considered payment  in full. [facility], the patient, and/or the patient's insurance are NOT to  be billed any balance, co-payment, or deductible, per Medicare regulations. **ATTN: Facility  providing services for attached/enclosed Living Donor Orders; If facility does  NOT AGREE to  the reimbursement rate stated above, PLEASE DENY SERVICES & refer Donor/patient back to  their [facility] Coordinator Transplant Center.    6. Patients are NOT to make any payments at the time of service.    Please forward this information to your billing department so that a donor account can be set up with  these instructions.    Should you have any questions, please contact the Donor Billing office at [phone],  Monday - Friday, 8:00 a.m. to 4:00 p.m.   Thank you for your assistance.

## 2022-01-26 NOTE — LETTER
PHYSICIAN ORDERS      DATE & TIME ISSUED: 2022 10:31 AM  PATIENT NAME: Cinthia Reno   : 1990     [facility] MR# [if applicable]: [id number]     DIAGNOSIS:  Potential Kidney Donor  ICD-10 CODE: z00.5     1. Hemoglobin  2. Iron  3. Iron and Iron binding Capacity  4. Ferritin     Please fax these results to [phone]      Connor Arellano

## 2022-01-26 NOTE — TELEPHONE ENCOUNTER
Talked to Cinthia about committee review results:    1. Needs to repeat hgb-heavy cycles   2. Need to complete iron studies   3. Start iron supplements  4. Talk about pregnancy post donation  5. Tissue typing pending  6. Needs pap smear    Cinthia will schedule her pap smear as well. Sent orders for her to complete labs in [place].

## 2022-02-01 NOTE — PROGRESS NOTES
Video-Visit Details     Type of service: Video Visit     Video Start Time: 9:00am  Video Stop Time: 9:30am  [place] [facility] [place] (provider location[place]  [facility]     Platform used for Video Visit: [facility]    Transplant Surgery Consult Note    Medical record number: [id number]  YOB: 1990,   Consult requested by the patient for evaluation of kidney donation candidacy.    Assessment and Recommendations: Ms. Reno appears to be a good candidate for kidney donation at this point in the evaluation. The following issues will need to be addressed prior to formal review:    CT abdomen and pelvis with contrast to be ordered for assessment vascular anatomy: Yes  Dietician consult ordered: Yes  Social work consult ordered: Yes  CXR and EKG ordered:  Yes  Transplant donor labs ordered to include HLA, ABOx2, Cr, Iohexol GFR or Cr clearance, virology etc.       Patient would like to donate to brother. The majority of our visit today was spent in counseling regarding the medical and surgical risks of kidney donation, the typical joss-and post-operative experience and recovery/return to work pattern.  We also talked about post-op visits and longer term health care maintenance, as well as the implications of having one remaining kidney. This discussion included, but was not limited to rates of complications such as bleeding, infection, need for transfusion, reoperation, other organ injury, future bowel obstruction, incisional hernia, port site pain, venous thrombosis, pulmonary embolism, renal failure, and death (3 per 10,000).     We discussed long-term risks in detail.  I discussed the articles suggesting a small increase in ESKD in donors and their limitations    We discussed pregnancy postdonation and the increased risk of hypertension and preeclampsia; and the need for more frequent OB visits.    We discussed recovery, including length of  hospital stay; no new meds other than pain meds on discharge; limitations after surgery (no driving for a couple of weeks; no lifting over 10 lbs or exercise stretching abdominal muscles for 6 weeks; return to work  and fatigue for the first few weeks postdonation).  I informed her of our donor survey results on donors feeling ready to drive, and on return to feeling back to normal.  We also discussed the need for maintaining a healthy lifestyle long-term after donation    We discussed the national [employer] system and the possibility of participating, if not a match for the intended recipient.  I explained how the system worked.    We discussed the increased risk for venous thrombosis for the 1st two postoperative risks.  We discussed that if the family were to drive home in the 1st 2 weeks, the  should stop approximately every 40 minutes and she should get out of the car and walk around for a couple of minutes. At the conclusion of the visit, all questions had been answered and Ms. Reno's candidacy for donation will be reviewed at our Multidisciplinary Donor Selection Committee. She will stay in contact with the nurse coordinator with any concerns.      40 min spent on the date of the encounter in chart review, patient visit,  documentation and/or discussion with other providers about the issues documented above.        Catracho Gorman MD  Surgical Director, Kidney Transplantation                                                                                                      ---------------------------------------------------------------------------------------------------    HPI: Cinthia Reno is a 31 year old year old female who presents for a kidney donor evaluation.          Past Medical History:   Diagnosis Date     Known health problems: none      Past Surgical History:   Procedure Laterality Date     NO HISTORY OF SURGERY       Family History   Problem Relation Age of Onset     No Known Problems  Mother      No Known Problems Father      Kidney Disease Brother      Diabetes Brother      No Known Problems Brother      No Known Problems Brother      Liver Disease Maternal Grandmother      Unknown/Adopted Paternal Grandmother      Unknown/Adopted Paternal Grandfather      No Known Problems Daughter      Social History     Socioeconomic History     Marital status: Single     Spouse name: Not on file     Number of children: Not on file     Years of education: Not on file     Highest education level: Not on file   Occupational History     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   Tobacco Use     Smoking status: Never Smoker     Smokeless tobacco: Never Used   Substance and Sexual Activity     Alcohol use: Not Currently     Drug use: Not Currently     Sexual activity: Not on file   Other Topics Concern     Not on file   Social History Narrative     Not on file     Social Determinants of Health     Financial Resource Strain: Not on file   Food Insecurity: Not on file   Transportation Needs: Not on file   Physical Activity: Not on file   Stress: Not on file   Social Connections: Not on file   Intimate Partner Violence: Not on file   Housing Stability: Not on file       ROS:   CONSTITUTIONAL:  No fevers or chills  EYES: negative for icterus  ENT:  negative for hearing loss, tinnitus and sore throat  RESPIRATORY:  negative for cough, sputum, dyspnea  CARDIOVASCULAR:  negative for chest pain  GASTROINTESTINAL:  negative for nausea, vomiting, diarrhea or constipation  GENITOURINARY:  negative for incontinence, dysuria, bladder emptying problems  HEME:  No easy bruising  INTEGUMENT:  negative for rash and pruritus  NEURO:  Negative for headache, seizure disorder    Allergies:   No Known Allergies    Medications:      Exam:      There is no height or weight on file to calculate BMI.  Constitutional - A&O in NAD.   Eyes - no redness or discharge.  Sclera anicteric  Respiratory - no cough, no labored  breathing  Musculoskeletal - range of motion normal  Skin - no discoloration, no jaundice  Neurological - no tremors.  No facial droop or dysarthria  Psychiatric - normal mood and affect  The rest of a comprehensive physical examination is deferred due to PHE (public health emergency) video visit restrictions     Diagnostics:   Recent Results (from the past 336 hour(s))   Partial thromboplastin time    Collection Time: 01/20/22  7:17 AM   Result Value Ref Range    aPTT 32 22 - 38 Seconds   INR    Collection Time: 01/20/22  7:17 AM   Result Value Ref Range    INR 1.11 0.85 - 1.15   Treponema Abs w Reflex to RPR and Titer    Collection Time: 01/20/22  7:18 AM   Result Value Ref Range    Treponema Antibody Total Nonreactive Nonreactive   HIV Antigen Antibody Combo Pretransplant    Collection Time: 01/20/22  7:18 AM   Result Value Ref Range    HIV Antigen Antibody Combo Pretransplant Nonreactive Nonreactive   Hepatitis B surface antigen    Collection Time: 01/20/22  7:18 AM   Result Value Ref Range    Hepatitis B Surface Antigen Nonreactive Nonreactive   Hepatitis B Surface Antibody    Collection Time: 01/20/22  7:18 AM   Result Value Ref Range    Hepatitis B Surface Antibody 664.32 (H) <8.00 m[IU]/mL   EBV Capsid Antibody IgM    Collection Time: 01/20/22  7:19 AM   Result Value Ref Range    EBV Capsid Ladonna IgM Instrument Value <10.0 <36.0 U/mL    EBV Capsid Antibody IgM No detectable antibody. No detectable antibody.   EBV Capsid Antibody IgG    Collection Time: 01/20/22  7:19 AM   Result Value Ref Range    EBV Capsid Ladonna IgG Instrument Value >750.0 (H) <18.0 U/mL    EBV Capsid Antibody IgG Positive (A) No detectable antibody.   CMV Antibody IgG    Collection Time: 01/20/22  7:19 AM   Result Value Ref Range    CMV Ladonna IgG Instrument Value 7.80 (H) <0.60 U/mL    CMV Antibody IgG Positive, suggests recent or past exposure. (A) No detectable antibody.    HCG quantitative pregnancy    Collection Time: 01/20/22  7:19 AM    Result Value Ref Range    hCG Quantitative <1 0 - 5 IU/L   West Nile Virus Antibody IgG IgM    Collection Time: 01/20/22  7:19 AM   Result Value Ref Range    West Nile IgG Serum 0.31 <=1.29 IV    West Nile IgM Serum 0.02 <=0.89 IV   Hepatitis C antibody    Collection Time: 01/20/22  7:19 AM   Result Value Ref Range    Hepatitis C Antibody Nonreactive Nonreactive   Hepatitis B core antibody    Collection Time: 01/20/22  7:19 AM   Result Value Ref Range    Hepatitis B Core Antibody Total Nonreactive Nonreactive   Phosphorus    Collection Time: 01/20/22  7:19 AM   Result Value Ref Range    Phosphorus 3.6 2.5 - 4.5 mg/dL   Uric acid    Collection Time: 01/20/22  7:19 AM   Result Value Ref Range    Uric Acid 3.3 2.6 - 6.0 mg/dL   Lipid Profile    Collection Time: 01/20/22  7:19 AM   Result Value Ref Range    Cholesterol 160 <200 mg/dL    Triglycerides 76 <150 mg/dL    Direct Measure HDL 50 >=50 mg/dL    LDL Cholesterol Calculated 95 <=100 mg/dL    Non HDL Cholesterol 110 <130 mg/dL    Patient Fasting > 8hrs? Yes    Comprehensive metabolic panel    Collection Time: 01/20/22  7:19 AM   Result Value Ref Range    Sodium 143 133 - 144 mmol/L    Potassium 4.1 3.4 - 5.3 mmol/L    Chloride 111 (H) 94 - 109 mmol/L    Carbon Dioxide (CO2) 23 20 - 32 mmol/L    Anion Gap 9 3 - 14 mmol/L    Urea Nitrogen 14 7 - 30 mg/dL    Creatinine 0.75 0.52 - 1.04 mg/dL    Calcium 8.6 8.5 - 10.1 mg/dL    Glucose 92 70 - 99 mg/dL    Alkaline Phosphatase 55 40 - 150 U/L    AST 14 0 - 45 U/L    ALT 19 0 - 50 U/L    Protein Total 7.4 6.8 - 8.8 g/dL    Albumin 3.8 3.4 - 5.0 g/dL    Bilirubin Total 0.3 0.2 - 1.3 mg/dL    GFR Estimate >90 >60 mL/min/1.73m2   CBC with platelets    Collection Time: 01/20/22  7:20 AM   Result Value Ref Range    WBC Count 7.4 4.0 - 11.0 10e3/uL    RBC Count 4.33 3.80 - 5.20 10e6/uL    Hemoglobin 10.9 (L) 11.7 - 15.7 g/dL    Hematocrit 34.8 (L) 35.0 - 47.0 %    MCV 80 78 - 100 fL    MCH 25.2 (L) 26.5 - 33.0 pg    MCHC 31.3  (L) 31.5 - 36.5 g/dL    RDW 17.2 (H) 10.0 - 15.0 %    Platelet Count 361 150 - 450 10e3/uL   Hemoglobin A1c    Collection Time: 01/20/22  7:20 AM   Result Value Ref Range    Hemoglobin A1C 5.2 0.0 - 5.6 %   Quantiferon TB Gold Plus Grey Tube    Collection Time: 01/20/22  7:20 AM    Specimen: Peripheral Blood   Result Value Ref Range    Quantiferon Nil Tube 0.13 IU/mL   Quantiferon TB Gold Plus Green Tube    Collection Time: 01/20/22  7:20 AM    Specimen: Peripheral Blood   Result Value Ref Range    Quantiferon TB1 Tube 0.06 IU/mL   Quantiferon TB Gold Plus Yellow Tube    Collection Time: 01/20/22  7:20 AM    Specimen: Peripheral Blood   Result Value Ref Range    Quantiferon TB2 Tube 0.07    Quantiferon TB Gold Plus Purple Tube    Collection Time: 01/20/22  7:20 AM    Specimen: Peripheral Blood   Result Value Ref Range    Quantiferon Mitogen 10.00 IU/mL   Adult Type and Screen    Collection Time: 01/20/22  7:20 AM   Result Value Ref Range    ABO/RH(D) O POS     Antibody Screen Negative Negative    SPECIMEN EXPIRATION DATE [id number]    Quantiferon TB Gold Plus    Collection Time: 01/20/22  7:20 AM    Specimen: Peripheral Blood   Result Value Ref Range    Quantiferon-TB Gold Plus Negative Negative    TB1 Ag minus Nil Value -0.07 IU/mL    TB2 Ag minus Nil Value -0.06 IU/mL    Mitogen minus Nil Result 9.87 IU/mL    Nil Result 0.13 IU/mL   ABO and Rh 2nd type and screen required    Collection Time: 01/20/22  7:44 AM   Result Value Ref Range    ABO/RH(D) O POS     SPECIMEN EXPIRATION DATE [id number]    HLA-ABC Typing High Resolution    Collection Time: 01/20/22  9:26 AM   Result Value Ref Range    ABTEST METHOD NGS     hiresA-1 A*02:06     hiresA-1Equiv 2     hiresA-2 A*31:18     hiresA-2Equiv 31     hiresB-1 B*27:05     hiresB-1Equiv 27     hiresB-2 B*35:01     hiresB-2Equiv 35     hiresC-1 C*02:02     hiresC-1Equiv 2     hiresC-2 C*04:04     hiresC-2Equiv 4     hiresBw-1 Bw*4     hiresBw-2 Bw*6    HLA-DR Typing  High Resolution    Collection Time: 01/20/22  9:26 AM   Result Value Ref Range    DRhiresTestM NGS     hiresDPA1-1 DPA1*01:03     hiresDPB1-1 DPB1*02:01     hiresDPB1-1N AFCTE 02:01/416:01     hiresDPB1-2 DPB1*04:02     hiresDPB1-2N FNVS 04:02/105:01     hiresDQA1-1 DQA1*03:01     hiresDQA1-2 DQA1*05:03     hiresDQB1-1 DQB1*03:01     hiresDQB1-1Equiv 7     hiresDQB1-2 DQB1*03:02     hiresDQB1-2Equiv 8     hiresDRB1-1 DRB1*04:07     hiresDRB1-1Equiv 4     hiresDRB1-2 DRB1*14:02     hiresDRB1-2Equiv 14     hiresDRB3-1 DRB3*01:01     hiresDRB3-1Equiv 52     hiresDRB4-2 DRB4*01:03     hiresDRB4-2Equiv 53    Iohexol 1st Order    Collection Time: 01/20/22  9:43 AM   Result Value Ref Range    Iohexol Time 1 5.53 mg/dL    Iohexol Time 2 3.11 mg/dL    Iohexol Body Surface Area 1.729 m2    Iohexol Raw Clearance 127 mL/min    Iohexol Std Clearance 127 /1.73 m2   Protein  random urine    Collection Time: 01/20/22 12:45 PM   Result Value Ref Range    Total Protein Random Urine g/L <0.05 g/L    Total Protein Urine g/gr Creatinine      Creatinine Urine mg/dL 50 mg/dL   Albumin Random Urine Quantitative with Creat Ratio    Collection Time: 01/20/22 12:45 PM   Result Value Ref Range    Creatinine Urine mg/dL 50 mg/dL    Albumin Urine mg/L <5 mg/L    Albumin Urine mg/g Cr     Routine UA with microscopic    Collection Time: 01/20/22 12:45 PM   Result Value Ref Range    Color Urine Straw Colorless, Straw, Light Yellow, Yellow    Appearance Urine Clear Clear    Glucose Urine Negative Negative mg/dL    Bilirubin Urine Negative Negative    Ketones Urine Negative Negative mg/dL    Specific Gravity Urine 1.020 1.003 - 1.035    Blood Urine Negative Negative    pH Urine 6.0 5.0 - 7.0    Protein Albumin Urine Negative Negative mg/dL    Urobilinogen Urine Normal Normal, 2.0 mg/dL    Nitrite Urine Negative Negative    Leukocyte Esterase Urine Negative Negative    Mucus Urine Present (A) None Seen /LPF    RBC Urine <1 <=2 /HPF    WBC Urine 1 <=5  /HPF    Squamous Epithelials Urine <1 <=1 /HPF   EKG 12-lead complete w/read - Clinics    Collection Time: 01/20/22  1:08 PM   Result Value Ref Range    Systolic Blood Pressure  mmHg    Diastolic Blood Pressure  mmHg    Ventricular Rate 63 BPM    Atrial Rate 63 BPM    KY Interval 130 ms    QRS Duration 84 ms     ms    QTc 405 ms    P Axis 25 degrees    R AXIS 6 degrees    T Axis 49 degrees    Interpretation ECG       Sinus rhythm  Normal ECG  No previous ECGs available  Confirmed by MD TONIE, MONET ([id number]) on 1/21/2022 12:00:55 AM

## 2022-02-09 ENCOUNTER — DOCUMENTATION ONLY (OUTPATIENT)
Dept: TRANSPLANT | Facility: CLINIC | Age: 32
End: 2022-02-09

## 2022-02-24 ENCOUNTER — DOCUMENTATION ONLY (OUTPATIENT)
Dept: TRANSPLANT | Facility: CLINIC | Age: 32
End: 2022-02-24

## 2022-04-19 ENCOUNTER — TELEPHONE (OUTPATIENT)
Dept: TRANSPLANT | Facility: CLINIC | Age: 32
End: 2022-04-19

## 2022-04-19 NOTE — TELEPHONE ENCOUNTER
Tried calling Cinthia regarding her follow up labs and pap smear to see if she completed those. Wasn't able to leave a voicemail since it was full.

## 2022-09-18 ENCOUNTER — HEALTH MAINTENANCE LETTER (OUTPATIENT)
Age: 32
End: 2022-09-18

## 2023-01-29 ENCOUNTER — HEALTH MAINTENANCE LETTER (OUTPATIENT)
Age: 33
End: 2023-01-29

## 2023-04-24 ENCOUNTER — TELEPHONE (OUTPATIENT)
Dept: TRANSPLANT | Facility: CLINIC | Age: 33
End: 2023-04-24

## 2023-04-24 NOTE — TELEPHONE ENCOUNTER
Spoke to Cinthia regarding PEP.     She has completed her pap smear and will send me the results.     She has also been taking her iron supplements and have encouraged her to take them on a daily basis now because we will be checking her iron studies at her refresher eval.     She isn't planning on getting pregnant right now but may think about in the next couple years. I did tell her we tell donors 6 months to a year before getting pregnant after donation.    She is going to come for eval on 5/26/23

## 2023-05-18 DIAGNOSIS — Z00.5 TRANSPLANT DONOR EVALUATION: Primary | ICD-10-CM

## 2023-05-18 RX ORDER — ALBUTEROL SULFATE 90 UG/1
1-2 AEROSOL, METERED RESPIRATORY (INHALATION)
Status: CANCELLED
Start: 2023-05-26

## 2023-05-18 RX ORDER — MEPERIDINE HYDROCHLORIDE 25 MG/ML
25 INJECTION INTRAMUSCULAR; INTRAVENOUS; SUBCUTANEOUS EVERY 30 MIN PRN
Status: CANCELLED | OUTPATIENT
Start: 2023-05-26

## 2023-05-18 RX ORDER — ALBUTEROL SULFATE 0.83 MG/ML
2.5 SOLUTION RESPIRATORY (INHALATION)
Status: CANCELLED | OUTPATIENT
Start: 2023-05-26

## 2023-05-18 RX ORDER — DIPHENHYDRAMINE HYDROCHLORIDE 50 MG/ML
50 INJECTION INTRAMUSCULAR; INTRAVENOUS
Status: CANCELLED
Start: 2023-05-26

## 2023-05-18 RX ORDER — METHYLPREDNISOLONE SODIUM SUCCINATE 125 MG/2ML
125 INJECTION, POWDER, LYOPHILIZED, FOR SOLUTION INTRAMUSCULAR; INTRAVENOUS
Status: CANCELLED
Start: 2023-05-26

## 2023-05-18 RX ORDER — EPINEPHRINE 1 MG/ML
0.3 INJECTION, SOLUTION, CONCENTRATE INTRAVENOUS EVERY 5 MIN PRN
Status: CANCELLED | OUTPATIENT
Start: 2023-05-26

## 2023-05-25 LAB
ABO/RH(D): NORMAL
ANTIBODY SCREEN: NEGATIVE
SPECIMEN EXPIRATION DATE: NORMAL

## 2023-05-26 ENCOUNTER — ALLIED HEALTH/NURSE VISIT (OUTPATIENT)
Dept: TRANSPLANT | Facility: CLINIC | Age: 33
End: 2023-05-26
Attending: INTERNAL MEDICINE

## 2023-05-26 ENCOUNTER — OFFICE VISIT (OUTPATIENT)
Dept: NEPHROLOGY | Facility: CLINIC | Age: 33
End: 2023-05-26
Attending: INTERNAL MEDICINE

## 2023-05-26 ENCOUNTER — OFFICE VISIT (OUTPATIENT)
Dept: INFUSION THERAPY | Facility: CLINIC | Age: 33
End: 2023-05-26
Attending: INTERNAL MEDICINE

## 2023-05-26 ENCOUNTER — OFFICE VISIT (OUTPATIENT)
Dept: TRANSPLANT | Facility: CLINIC | Age: 33
End: 2023-05-26
Attending: INTERNAL MEDICINE

## 2023-05-26 ENCOUNTER — LAB (OUTPATIENT)
Dept: LAB | Facility: CLINIC | Age: 33
End: 2023-05-26
Attending: INTERNAL MEDICINE

## 2023-05-26 ENCOUNTER — THERAPY VISIT (OUTPATIENT)
Dept: TRANSPLANT | Facility: CLINIC | Age: 33
End: 2023-05-26
Attending: INTERNAL MEDICINE

## 2023-05-26 ENCOUNTER — ANCILLARY PROCEDURE (OUTPATIENT)
Dept: GENERAL RADIOLOGY | Facility: CLINIC | Age: 33
End: 2023-05-26
Attending: INTERNAL MEDICINE

## 2023-05-26 ENCOUNTER — ANCILLARY PROCEDURE (OUTPATIENT)
Dept: ULTRASOUND IMAGING | Facility: CLINIC | Age: 33
End: 2023-05-26
Attending: INTERNAL MEDICINE

## 2023-05-26 VITALS
OXYGEN SATURATION: 100 % | RESPIRATION RATE: 16 BRPM | BODY MASS INDEX: 24.42 KG/M2 | HEART RATE: 59 BPM | WEIGHT: 155.6 LBS | HEIGHT: 67 IN | DIASTOLIC BLOOD PRESSURE: 71 MMHG | SYSTOLIC BLOOD PRESSURE: 114 MMHG | TEMPERATURE: 97.6 F

## 2023-05-26 VITALS
TEMPERATURE: 97.7 F | OXYGEN SATURATION: 99 % | SYSTOLIC BLOOD PRESSURE: 112 MMHG | RESPIRATION RATE: 16 BRPM | WEIGHT: 155.3 LBS | DIASTOLIC BLOOD PRESSURE: 77 MMHG | HEART RATE: 60 BPM | HEIGHT: 65 IN | BODY MASS INDEX: 25.87 KG/M2

## 2023-05-26 DIAGNOSIS — Z00.5 TRANSPLANT DONOR EVALUATION: ICD-10-CM

## 2023-05-26 DIAGNOSIS — Z00.5 TRANSPLANT DONOR EVALUATION: Primary | ICD-10-CM

## 2023-05-26 DIAGNOSIS — D50.0 IRON DEFICIENCY ANEMIA DUE TO CHRONIC BLOOD LOSS: ICD-10-CM

## 2023-05-26 LAB
ABO/RH(D): NORMAL
ALBUMIN MFR UR ELPH: 12.1 MG/DL (ref 1–14)
ALBUMIN SERPL BCG-MCNC: 4 G/DL (ref 3.5–5.2)
ALBUMIN UR-MCNC: 10 MG/DL
ALP SERPL-CCNC: 57 U/L (ref 35–104)
ALT SERPL W P-5'-P-CCNC: 13 U/L (ref 10–35)
ANION GAP SERPL CALCULATED.3IONS-SCNC: 8 MMOL/L (ref 7–15)
APPEARANCE UR: CLEAR
APTT PPP: 30 SECONDS (ref 22–38)
AST SERPL W P-5'-P-CCNC: 18 U/L (ref 10–35)
BILIRUB SERPL-MCNC: 0.3 MG/DL
BILIRUB UR QL STRIP: NEGATIVE
BUN SERPL-MCNC: 11.8 MG/DL (ref 6–20)
CALCIUM SERPL-MCNC: 8.7 MG/DL (ref 8.6–10)
CHLORIDE SERPL-SCNC: 110 MMOL/L (ref 98–107)
CHOLEST SERPL-MCNC: 145 MG/DL
CMV IGG SERPL IA-ACNC: 4.6 U/ML
CMV IGG SERPL IA-ACNC: ABNORMAL
COLOR UR AUTO: YELLOW
CREAT SERPL-MCNC: 0.81 MG/DL (ref 0.51–0.95)
CREAT UR-MCNC: 271 MG/DL
CREAT UR-MCNC: 272 MG/DL
DEPRECATED HCO3 PLAS-SCNC: 22 MMOL/L (ref 22–29)
EBV VCA IGG SER IA-ACNC: >750 U/ML
EBV VCA IGG SER IA-ACNC: POSITIVE
EBV VCA IGM SER IA-ACNC: <10 U/ML
EBV VCA IGM SER IA-ACNC: NORMAL
ERYTHROCYTE [DISTWIDTH] IN BLOOD BY AUTOMATED COUNT: 16.2 % (ref 10–15)
FERRITIN SERPL-MCNC: 17 NG/ML (ref 6–175)
GFR SERPL CREATININE-BSD FRML MDRD: >90 ML/MIN/1.73M2
GLUCOSE SERPL-MCNC: 96 MG/DL (ref 70–99)
GLUCOSE UR STRIP-MCNC: NEGATIVE MG/DL
HBA1C MFR BLD: 5.3 %
HCG INTACT+B SERPL-ACNC: <1 MIU/ML
HCT VFR BLD AUTO: 36.7 % (ref 35–47)
HDLC SERPL-MCNC: 44 MG/DL
HGB BLD-MCNC: 11.6 G/DL (ref 11.7–15.7)
HGB UR QL STRIP: NEGATIVE
INR PPP: 1.09 (ref 0.85–1.15)
IRON BINDING CAPACITY (ROCHE): 362 UG/DL (ref 240–430)
IRON SATN MFR SERPL: 9 % (ref 15–46)
IRON SERPL-MCNC: 33 UG/DL (ref 37–145)
KETONES UR STRIP-MCNC: NEGATIVE MG/DL
LDLC SERPL CALC-MCNC: 84 MG/DL
LEUKOCYTE ESTERASE UR QL STRIP: NEGATIVE
MCH RBC QN AUTO: 26.1 PG (ref 26.5–33)
MCHC RBC AUTO-ENTMCNC: 31.6 G/DL (ref 31.5–36.5)
MCV RBC AUTO: 83 FL (ref 78–100)
MICROALBUMIN UR-MCNC: <12 MG/L
MICROALBUMIN/CREAT UR: NORMAL MG/G{CREAT}
MUCOUS THREADS #/AREA URNS LPF: PRESENT /LPF
NITRATE UR QL: NEGATIVE
NONHDLC SERPL-MCNC: 101 MG/DL
PH UR STRIP: 6 [PH] (ref 5–7)
PHOSPHATE SERPL-MCNC: 2.9 MG/DL (ref 2.5–4.5)
PLATELET # BLD AUTO: 312 10E3/UL (ref 150–450)
POTASSIUM SERPL-SCNC: 3.9 MMOL/L (ref 3.4–5.3)
PROT SERPL-MCNC: 6.9 G/DL (ref 6.4–8.3)
PROT/CREAT 24H UR: 0.04 MG/MG CR (ref 0–0.2)
RBC # BLD AUTO: 4.44 10E6/UL (ref 3.8–5.2)
RBC URINE: <1 /HPF
SODIUM SERPL-SCNC: 140 MMOL/L (ref 136–145)
SP GR UR STRIP: 1.03 (ref 1–1.03)
SPECIMEN EXPIRATION DATE: NORMAL
SQUAMOUS EPITHELIAL: 1 /HPF
T PALLIDUM AB SER QL: NONREACTIVE
TRANSITIONAL EPI: <1 /HPF
TRIGL SERPL-MCNC: 86 MG/DL
URATE SERPL-MCNC: 4.3 MG/DL (ref 2.4–5.7)
UROBILINOGEN UR STRIP-MCNC: NORMAL MG/DL
WBC # BLD AUTO: 7.6 10E3/UL (ref 4–11)
WBC URINE: 1 /HPF

## 2023-05-26 PROCEDURE — 83036 HEMOGLOBIN GLYCOSYLATED A1C: CPT

## 2023-05-26 PROCEDURE — 99207 PR NO CHARGE COORDINATED CARE PS: CPT

## 2023-05-26 PROCEDURE — 36415 COLL VENOUS BLD VENIPUNCTURE: CPT

## 2023-05-26 PROCEDURE — 81001 URINALYSIS AUTO W/SCOPE: CPT

## 2023-05-26 PROCEDURE — 86803 HEPATITIS C AB TEST: CPT

## 2023-05-26 PROCEDURE — 86644 CMV ANTIBODY: CPT

## 2023-05-26 PROCEDURE — 86850 RBC ANTIBODY SCREEN: CPT

## 2023-05-26 PROCEDURE — 82542 COL CHROMOTOGRAPHY QUAL/QUAN: CPT | Performed by: SURGERY

## 2023-05-26 PROCEDURE — 84156 ASSAY OF PROTEIN URINE: CPT

## 2023-05-26 PROCEDURE — 80053 COMPREHEN METABOLIC PANEL: CPT

## 2023-05-26 PROCEDURE — 82728 ASSAY OF FERRITIN: CPT

## 2023-05-26 PROCEDURE — 71046 X-RAY EXAM CHEST 2 VIEWS: CPT | Performed by: RADIOLOGY

## 2023-05-26 PROCEDURE — 85730 THROMBOPLASTIN TIME PARTIAL: CPT

## 2023-05-26 PROCEDURE — 86901 BLOOD TYPING SEROLOGIC RH(D): CPT

## 2023-05-26 PROCEDURE — 86481 TB AG RESPONSE T-CELL SUSP: CPT

## 2023-05-26 PROCEDURE — G0463 HOSPITAL OUTPT CLINIC VISIT: HCPCS | Performed by: SURGERY

## 2023-05-26 PROCEDURE — 83550 IRON BINDING TEST: CPT

## 2023-05-26 PROCEDURE — 84550 ASSAY OF BLOOD/URIC ACID: CPT

## 2023-05-26 PROCEDURE — 76770 US EXAM ABDO BACK WALL COMP: CPT | Performed by: RADIOLOGY

## 2023-05-26 PROCEDURE — 84702 CHORIONIC GONADOTROPIN TEST: CPT

## 2023-05-26 PROCEDURE — 80061 LIPID PANEL: CPT

## 2023-05-26 PROCEDURE — 86788 WEST NILE VIRUS AB IGM: CPT

## 2023-05-26 PROCEDURE — 99417 PROLNG OP E/M EACH 15 MIN: CPT | Performed by: INTERNAL MEDICINE

## 2023-05-26 PROCEDURE — 255N000002 HC RX 255 OP 636: Performed by: INTERNAL MEDICINE

## 2023-05-26 PROCEDURE — 84100 ASSAY OF PHOSPHORUS: CPT

## 2023-05-26 PROCEDURE — 82570 ASSAY OF URINE CREATININE: CPT

## 2023-05-26 PROCEDURE — 87340 HEPATITIS B SURFACE AG IA: CPT

## 2023-05-26 PROCEDURE — 36415 COLL VENOUS BLD VENIPUNCTURE: CPT | Performed by: SURGERY

## 2023-05-26 PROCEDURE — 86706 HEP B SURFACE ANTIBODY: CPT

## 2023-05-26 PROCEDURE — 86780 TREPONEMA PALLIDUM: CPT

## 2023-05-26 PROCEDURE — 85610 PROTHROMBIN TIME: CPT

## 2023-05-26 PROCEDURE — 93000 ELECTROCARDIOGRAM COMPLETE: CPT | Performed by: INTERNAL MEDICINE

## 2023-05-26 PROCEDURE — 99215 OFFICE O/P EST HI 40 MIN: CPT | Performed by: INTERNAL MEDICINE

## 2023-05-26 PROCEDURE — 85014 HEMATOCRIT: CPT

## 2023-05-26 PROCEDURE — 99205 OFFICE O/P NEW HI 60 MIN: CPT | Performed by: SURGERY

## 2023-05-26 PROCEDURE — 86704 HEP B CORE ANTIBODY TOTAL: CPT

## 2023-05-26 PROCEDURE — 86789 WEST NILE VIRUS ANTIBODY: CPT

## 2023-05-26 PROCEDURE — 86665 EPSTEIN-BARR CAPSID VCA: CPT

## 2023-05-26 RX ORDER — MEPERIDINE HYDROCHLORIDE 25 MG/ML
25 INJECTION INTRAMUSCULAR; INTRAVENOUS; SUBCUTANEOUS EVERY 30 MIN PRN
Status: CANCELLED | OUTPATIENT
Start: 2023-05-26

## 2023-05-26 RX ORDER — ALBUTEROL SULFATE 90 UG/1
1-2 AEROSOL, METERED RESPIRATORY (INHALATION)
Status: CANCELLED
Start: 2023-05-26

## 2023-05-26 RX ORDER — DIPHENHYDRAMINE HYDROCHLORIDE 50 MG/ML
50 INJECTION INTRAMUSCULAR; INTRAVENOUS
Status: CANCELLED
Start: 2023-05-26

## 2023-05-26 RX ORDER — FERROUS SULFATE 325(65) MG
325 TABLET ORAL
COMMUNITY
Start: 2023-05-26

## 2023-05-26 RX ORDER — EPINEPHRINE 1 MG/ML
0.3 INJECTION, SOLUTION INTRAMUSCULAR; SUBCUTANEOUS EVERY 5 MIN PRN
Status: CANCELLED | OUTPATIENT
Start: 2023-05-26

## 2023-05-26 RX ORDER — METHYLPREDNISOLONE SODIUM SUCCINATE 125 MG/2ML
125 INJECTION, POWDER, LYOPHILIZED, FOR SOLUTION INTRAMUSCULAR; INTRAVENOUS
Status: CANCELLED
Start: 2023-05-26

## 2023-05-26 RX ORDER — ALBUTEROL SULFATE 0.83 MG/ML
2.5 SOLUTION RESPIRATORY (INHALATION)
Status: CANCELLED | OUTPATIENT
Start: 2023-05-26

## 2023-05-26 RX ADMIN — IOHEXOL 4 ML: 350 INJECTION, SOLUTION INTRAVENOUS at 07:38

## 2023-05-26 ASSESSMENT — PAIN SCALES - GENERAL
PAINLEVEL: NO PAIN (0)
PAINLEVEL: NO PAIN (0)

## 2023-05-26 NOTE — PROGRESS NOTES
[facility]  Consult Note     Medical record number: [id number]  YOB: 1990,     Date of Visit:  05/26/2023  Consult requested by the patient for evaluation of kidney donation candidacy.    Assessment and Recommendations: Ms. Reno appears to be a good candidate for kidney donation at this point in the evaluation. The following issues will need to be addressed prior to formal review:    32 yo no sig PMH  1 childbirth   No PSH abdomen  Recip has DSA to her  WIll need to do KPD  Counseled on advanced vs pair  Inclined towards waiting at the moment as a pair  Will think about advanced donation    CT single single  Blood type O    Risks of the surgical procedure including but not limited to the rare risk of mortality discussed in detail. Patient verbalized good understanding and had several pertinent questions which were answered satisfactorily.         Iohexol ordered for today for assessment of adequate kidney function for donation. Will be reviewed when resulted  Donor labs ordered for today and reviewed to include: CBC, CMP, Mg, PO4, hemoglobin A1c, lipid panel, blood type x 2, hemoglobin A1c, UA with microscopic, urine culture, urine protein/cr, INR/PTT, Quantiferon gold, hepatitis C (antibody), hepatitis B panel, HIV, treponemia, West Nile (antibody panel), CMV (antibody panel), EBV (antibody panel), pregnancy screen (for females of childbearing age), PSA (males >50yrs), HLA tissue typing, buccal swab for eplet typing  Social work consult ordered  Dietician consult ordered  Transplant nephrology consult ordered  Transplant coordinator consult ordered  CHEN (independent living donor advovate) consult ordered  EKG ordered for today and will be reviewed when resulted. Suitable to proceed today with this testing today:  Yes   Chest x-ray ordered for today and will be reviewed when resulted. Suitable to proceed with this testing today:  Yes   CT angio of abdomen and pelvis for  anatomical assessment. Images and report to be reviewed when resulted.  Suitable to proceed with this testing today:  No  After review of the above, additional testing/concerns include:  none    The majority of our visit today was spent in counseling regarding the medical and surgical risks of kidney donation, the typical joss-and post-operative experience and recovery/return to work pattern.  We also talked about post-op visits and longer term health care maintenance, as well as the implications of having one remaining kidney. This discussion included, but was not limited to rates of complications such as bleeding, infection, need for transfusion, reoperation, other organ injury, future bowel obstruction, incisional hernia, port site pain, varicocele, venous thrombosis, pulmonary embolism, renal failure, and death (3 per 10,000). At the conclusion of the visit, all questions had been answered and Ms. Reno's candidacy for donation will be reviewed at our Multidisciplinary Donor Selection Committee.     60 min spent on the date of the encounter in chart review, patient visit,  documentation and/or discussion with other providers about the issues documented above.    .    ---------------------------------------------------------------------------------------------------    HPI: Ms. Reno wishes to donate a kidney to brother.           NO  Personal history of cancer    []         Comment:     Personal history of kidney problems   []         Comment:   Personal history of diabetes    []         Comment:                  Bladder emptying problems (prostate, urinary retention) []         Comment:   Neck or Back problems:     []         Comment:   Constipation      []         Comment:       Frequent NSAID use:         []         Comment:       Other:        []         Comment:                Past Medical History:   Diagnosis Date     Anemia      Past Surgical History:   Procedure Laterality Date     NO HISTORY OF  SURGERY       Family History   Problem Relation Age of Onset     No Known Problems Mother      No Known Problems Father      Kidney Disease Brother      Diabetes Brother      No Known Problems Brother      No Known Problems Brother      Liver Disease Maternal Grandmother      Unknown/Adopted Paternal Grandmother      Unknown/Adopted Paternal Grandfather      No Known Problems Daughter      Social History     Socioeconomic History     Marital status: Single     Spouse name: Not on file     Number of children: Not on file     Years of education: Not on file     Highest education level: Not on file   Occupational History     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   Tobacco Use     Smoking status: Never     Smokeless tobacco: Never   Vaping Use     Vaping status: Not on file   Substance and Sexual Activity     Alcohol use: Not Currently     Drug use: Not Currently     Sexual activity: Not on file   Other Topics Concern     Not on file   Social History Narrative     Not on file     Social Determinants of Health     Financial Resource Strain: Not on file   Food Insecurity: Not on file   Transportation Needs: Not on file   Physical Activity: Not on file   Stress: Not on file   Social Connections: Not on file   Intimate Partner Violence: Not on file   Housing Stability: Not on file       ROS:   CONSTITUTIONAL:  No fevers or chills  EYES: negative for icterus  ENT:  negative for hearing loss, tinnitus and sore throat  RESPIRATORY:  negative for cough, sputum, dyspnea  CARDIOVASCULAR:  negative for chest pain  GASTROINTESTINAL:  negative for nausea, vomiting, diarrhea or constipation  GENITOURINARY:  negative for incontinence, dysuria, bladder emptying problems  HEME:  No easy bruising  INTEGUMENT:  negative for rash and pruritus  NEURO:  Negative for headache, seizure disorder    Allergies:   No Known Allergies    Medications:  Prescription Medications as of 5/26/2023       Rx Number Disp Refills Start  End Last Dispensed Date Next Fill Date Owning Pharmacy    ferrous sulfate (FEROSUL) 325 (65 Fe) MG tablet    5/26/2023        Sig: Take 1 tablet (325 mg) by mouth daily (with breakfast)    Class: Historical    Route: Oral      Clinic-Administered Medications as of 5/26/2023       Dose Frequency Start End    iohexol (OMNIPAQUE) 350 MG/ML injectable solution 4 mL (Completed) 4 mL ONCE 5/26/2023 5/26/2023    Route: Intravenous        Exam:   Temp:  [97.6  F (36.4  C)-97.7  F (36.5  C)] 97.6  F (36.4  C)  Pulse:  [59-60] 59  Resp:  [16] 16  BP: (109-117)/(69-77) 114/71  SpO2:  [99 %-100 %] 100 %  Appearance: in no apparent distress.   Skin: normal  Head and Neck: Normal, no rashes or jaundice  Respiratory: normal respiratory excursions, no audible wheeze  Cardiovascular: RRR  Abdomen: rounded, No surgical scars   Extremeties: Edema, none  Neuro: without deficit       Labs:   ABO: O POS  Chemistries:   Recent Labs   Lab Test 05/26/23  0706 01/20/22  0719    143   POTASSIUM 3.9 4.1   CHLORIDE 110* 111*   CO2 22 23   ANIONGAP 8 9   GLC 96 92   BUN 11.8 14   CR 0.81 0.75   ELLA 8.7 8.6       Urine Studies:   Recent Labs   Lab Test 05/26/23  0834 01/20/22  1245   COLOR Yellow Straw   APPEARANCE Clear Clear   URINEGLC Negative Negative   URINEBILI Negative Negative   URINEKETONE Negative Negative   SG 1.035 1.020   UBLD Negative Negative   URINEPH 6.0 6.0   PROTEIN 10* Negative   NITRITE Negative Negative   LEUKEST Negative Negative   RBCU <1 <1   WBCU 1 1     Recent Labs   Lab Test 01/20/22  1245   MICROL <5       Hematology:      Recent Labs   Lab Test 05/26/23  0706   WBC 7.6   RBC 4.44   HGB 11.6*   HCT 36.7   MCV 83   MCH 26.1*   MCHC 31.6   RDW 16.2*          Coags:   Recent Labs   Lab Test 05/26/23  0706   INR 1.09       Lipid Profile:   Cholesterol   Date Value Ref Range Status   05/26/2023 145 <200 mg/dL Final     Triglycerides   Date Value Ref Range Status   05/26/2023 86 <150 mg/dL Final     Direct  Measure HDL   Date Value Ref Range Status   05/26/2023 44 (L) >=50 mg/dL Final     LDL Cholesterol Calculated   Date Value Ref Range Status   05/26/2023 84 <=100 mg/dL Final     Non HDL Cholesterol   Date Value Ref Range Status   05/26/2023 101 <130 mg/dL Final       Virals:  CMV and EBV pending.     Recent Labs   Lab Test 01/20/22  0719 01/20/22  0718   HBCAB Nonreactive  --    HEPBANG  --  Nonreactive        Hepatitis C Antibody   Date Value Ref Range Status   01/20/2022 Nonreactive Nonreactive Final       Hepatitis C Antibody   Date Value Ref Range Status   01/20/2022 Nonreactive Nonreactive Final

## 2023-05-26 NOTE — PROGRESS NOTES
Iohexol Timed Test Nursing Note    Cinthia Reno comes to [facility] today for a Iohexol GFR Timed test.   Orders from Dr. Steve Mcleod were completed.    Progress Note    The following information was verified with the patient:  *Female patients are not pregnant or could not have become recently pregnant: No  *Is there a history of allergy (skin rash, swelling, ect) to :   a. Iodine (except skin reactions to Betadine): NO   b. Intravenous radio-contrast agents: NO   c. Seafood: NO     RN provided patient with educational handout regarding timed test. YES    Medication administered :   Iohexol (Omnipaque 300 mg Iodine/ ml concentration) 5 mls.  Site administered left ac  Start [id number]  Stop [id number]    Blood draws will be taken by tx RN.          Patient tolerated the procedure well    Vitals were reviewed   /77   Pulse 60   Temp 97.7  F (36.5  C) (Oral)   Resp 16   Wt 70.4 kg (155 lb 4.8 oz)   SpO2 99%   BMI 25.84 kg/m      Discharge Plan    Discharge instructions reviewed with patient: YES  Discharge papers printed and given to patient: YES  Patient/Representative verbalized understanding, all questions answered: YES      Anne Looney, RN

## 2023-05-26 NOTE — PROGRESS NOTES
Living Kidney Donor Consent per OPTN Policy 14.2 for Independent Living Donor Advocate (CHEN)    Organ Type: Related Kidney Donor  Presenting Information:  Cinthia Reno presents to [facility] to complete a living donor evaluation since She is interested in becoming a kidney donor for her step brother, Gold Conde.  She came here alone today.  She was tearful during our visit, having just learned that she would need to donate through paired exchange, and that it would be best if she donated in advance.  We discussed this at length.  She is comfortable with donation through PEP, but not in advance.      Written assurance has been obtained from the potential donor that he/she:   Is willing to donate  Is free from inducement and coercion  Has been informed that the he/she may decline to donate at any time  Has been informed that transplant centers must:   A) Offer donors an opportunity to discontinue the donor consent or evaluation process in a way that is protected and confidential  B) Provide an independent living donor advocate (CHEN) to assist the potential donor during this process    The following was presented to the potential donor in a language in which the potential donor is able to engage in meaningful dialogue:   Education and instruction about all phases of the living donation process including:   Consent  Medical and psychosocial evaluation  Information about the surgical procedure  Pre and post operative care  Benefits of post operative follow up  Disclosure that the recovery hospital will take all reasonable precautions to provide confidentiality for the donor/recipient  Disclosure that it is a federal crime for any person to knowingly acquire, obtain or otherwise transfer any human organ for valuable consideration  Disclosure that the recovery hospital must provide an independent living donor advocate  (CHEN)  Disclosure that health information obtained during the evaluation is subject to the same regulations as all records and could reveal conditions that must be reported to local, state, or federal public health authorities  Disclosure that the [facility] is required to report living donor follow up information at 6 months, 1 year, and 2 years, and that the potential donor must commit to post operative follow up testing coordinated by the [facility]    Disclosure has been provided that these risks may be transient or permanent & include but are not limited to:  Potential psychosocial risks:  Problems with body image  Post-surgery depression or anxiety  Feelings of emotional distress or bereavement if recipient experiences any recurrent disease or in the event of the recipient s death  Impact of donation on the donor s lifestyle, such as limited ability to exercise in the short term post operative recovery period, no driving for the first 2 weeks post op or until the donor is no longer needing pain medications that impair the ability to drive.      Potential financial impacts:  Personal expenses of travel, housing, , lost wages related to donation might not be reimbursed. However, resources may be available to defray some donation-related expenses.   Need for life-long follow up at the donor s expense  Loss of employment or income  Negative impact on the ability to obtain future employment  Negative impact on the ability to obtain, maintain, or afford health, disability, and life insurance  Future health problems experienced by living donors following donation may not be covered by the recipient s insurance      PREPARATION FOR DONATION, RECOVERY, AND POTENTIAL SHORT-LONG-TERM OUTCOMES:  Understanding of the Living Donation Process:  We discussed the role of Independent Living Donor Advocate.  Short and long term medical and psychosocial risks to both, donor and recipient were reviewed and  she is capable of understanding the risks.  Post surgical restrictions (2 weeks no driving, 6 weeks no lifting over 10 lbs) were reviewed and she appears capable of adhering to the post surgical requirements. The need for a caregiver was discussed and her mother will care for her .  The risk of poor psychosocial outcome including problems with body image, post-surgery depression or anxiety, or feelings of emotional distress or bereavement if recipient experiences any recurrent disease, poor outcome or death was reviewed.  Additionally, potential financial implications, including the risk of having difficulty obtaining health care insurance, life insurance, disability insurance, or long term care insurance were reviewed, as were available donor grants to assist with donor related expenses.      IMPRESSIONS/RECOMMENDATIONS:  Cinthia Reno appears  motivated to donate a kidney to her brother, but is not interested in being an advanced donor.  She was tearful that she could not donate directly to him.  She would be alright with PEP as long as the surgeriss were on the SAME DAY.  She appears capable of understanding this information and making an informed medical decision.  She has my contact information and is aware that I am available thoughout the donation process.    Contact Information:  HELENA ZAIDI, [facility]  Clinical  and Independent Donor Advocate  [facility]  Phone - [phone]  Pager - [phone]  [email or web address]      Time Spent: 30 minutes

## 2023-05-26 NOTE — NURSING NOTE
Chief Complaint   Patient presents with     Infusion     iohexol (OMNIPAQUE)      Blood Draw     Labs drawn from PIV placed by RN. Line flushed with saline. Vitals taken. Pt checked in for appointment(s).      Urine cup sent with patient.     Olivia CENTENO RN PHN BSN  BMT/Oncology Lab

## 2023-05-26 NOTE — LETTER
5/26/2023         RE: Cinthia Reno  [address]  [place] [address]        Dear Colleague,    Thank you for referring your patient, Cinthia Reno, to the [facility]. Please see a copy of my visit note below.    Iohexol Timed Test Nursing Note    Cinthia Reno comes to [facility] today for a Iohexol GFR Timed test.   Orders from Dr. Steve Mcleod were completed.    Progress Note    The following information was verified with the patient:  *Female patients are not pregnant or could not have become recently pregnant: No  *Is there a history of allergy (skin rash, swelling, ect) to :   a. Iodine (except skin reactions to Betadine): NO   b. Intravenous radio-contrast agents: NO   c. Seafood: NO     RN provided patient with educational handout regarding timed test. YES    Medication administered :   Iohexol (Omnipaque 300 mg Iodine/ ml concentration) 5 mls.  Site administered left ac  Start [id number]  Stop [id number]    Blood draws will be taken by tx RN.          Patient tolerated the procedure well    Vitals were reviewed   /77   Pulse 60   Temp 97.7  F (36.5  C) (Oral)   Resp 16   Wt 70.4 kg (155 lb 4.8 oz)   SpO2 99%   BMI 25.84 kg/m      Discharge Plan    Discharge instructions reviewed with patient: YES  Discharge papers printed and given to patient: YES  Patient/Representative verbalized understanding, all questions answered: YES      Anne Looney, JANE          Again, thank you for allowing me to participate in the care of your patient.        Sincerely,        Specialty Infusion Nurse

## 2023-05-26 NOTE — NURSING NOTE
Saw Cinthia in clinic on  for Living Kidney Donor Evaluation.     Cinthia is interested in donation for her brother.    I provided a folder which included copies of the followin. Living Kidney Donor Evaluation Consent  2. Paired Exchange Consent  3. Donor Shield Pamphlet  4. Living Donor Collective Study information  5. Kidney for Life pamphlet  6. Kidney Donors are Heroes! Study synopsis  7. Most current SRTR data.      I also provided a parking pass and food voucher.      I reviewed the Living Kidney Donor Evaluation Consent, dated 2020 and Paired Exchange/ NDD consent dated 2018.  I answered any question.    Evaluation Notes:    Refresher evaluation    She needs to set up appointment with PCP/GYN to discuss heavy menstruation.

## 2023-05-26 NOTE — LETTER
"2023       RE: Cinthia Reno  [address]  [place] [address]     Dear Colleague,    Thank you for referring your patient, Cinthia Reno, to the [facility] NEPHROLOGY [facility] [place] at [facility]. Please see a copy of my visit note below.    TRANSPLANT NEPHROLOGY DONOR EVALUATION    Assessment and Plan:  # Prospective Kidney Transplant Donor: Patient with one issue that needs to be addressed prior to donation. Patient's blood pressure is acceptable at this visit, kidney function appears to be acceptable with Iohexol pending, and urinalysis is bland.   -recommend she see GYN but should not hold up donation   -Obtain echo given change in EKG from last year   -Obtain fecal occult blood. If negative then she is iron deficient from heavy periods.     # Lack of health insurance:   -Uses [employer]     # Woman of child bearing age:              -discussed risks of pregnancy post donation including gestational DM, gestational HTN,  delivery, small for gestational age infant, increased risk for preeclampsia, miscarriage    # Anemia:    -Hb 10.9 in 2022 thought due to heavy menstrual periods, increased to 11.6. Per patient this has improved since prior evaluation and now having \"normal periods\".    -Has been taking iron supplement for the past month   -Iron deficient based on labs   -She should see GYN but should not hold up donation. Obtain fecal occult blood    # Cardiac risk:   -EKG with non specific inferior lead T wave changes. Obtain echo    # Healthcare maintenance:   -Pap smear: 3/3/22 negative, repeat in 5 years     Discussed the risks of donating a kidney, including the surgical risk and the possible risks of living with one kidney.    Education about expected post-donation kidney function and how chronic kidney disease (CKD) and end stage kidney disease (ESKD) might potentially impact the donor in the future, " include, but not limited to:       - On average, donors will have 25-35% permanent loss of kidney function at donation.       - Baseline risk of ESKD may slightly exceed that of members of the general population with the same demographic profile.       - Donor risks must be interpreted in light of known epidemiology of both CKD or ESKD, such as that CKD generally develops in midlife (40-50 years old) and ESKD generally develops after age 60.       - Medical evaluation of young potential donors cannot predict lifetime risk of CKD or ESKD.       - Donors may be at higher risk for CKD if they sustain damage to the remaining kidney.       - Development of CKD and progression of ESKD may be more rapid with only 1 kidney.       - Some type of kidney replacement therapy, either kidney transplant or dialysis, is required when reaching ESKD.    Potential medical or surgical risks include, but not limited to:       - Death.       - Scars, pain, fatigue, and other consequences typical of any surgical procedure.       - Decreased kidney function.       - Abdominal or bowel symptoms, such as bloating and nausea, and developing bowel obstruction.       - Kidney failure (ESKD) and the need for a kidney transplant or dialysis for the donor.       - Impact of obesity, hypertension, or other donor-specific medical conditions on morbidity and mortality of the potential donor.    Patients overall evaluation will be discussed with the transplant team and a final recommendation on the patients' suitability to be a kidney transplant donor will be made at that time.    Juan R Mcbride MD, [facility]  Transplant Nephrology  Pager: [phone]    I spent 73 minutes on the date of the encounter doing chart review, review of outside records, review of test results, interpretation of tests, patient visit and documentation      Consult:  Cinthia Reno was seen in consultation at the request of Dr. Parish Miranda for evaluation as a potential  kidney transplant donor.    Reason for Visit:  Cinthia Reno is a 33 year old female who presents for a kidney donor evaluation.  Patient would like to donate to her half brother.    Present Condition and Donor-Related Medical History:    Ms. Reno is a 33yF who was previously evaluated to be a living kidney donor to her half brother in 1/2022, recommended doing PAP smear, repeating Hb due to heavy menstrual cycles but she was lost to follow up. She then called 4/24/23 stating that she did complete her PAP smear, has been taking iron supplements, and would come in for refresher eval. She is ABO O with iohexol raw of 127ml/min. Her CTA showed single/single/single bilaterally with 137.5cc R kidney and 153cc L kidney.          Kidney Disease Hx:       h/o Kidney Problems: No  Family h/o Genetic Kidney Disease: No       h/o Hypertension: No    Usual Blood Pressure: 110s systolic       h/o Protein in Urine: No  h/o Blood in Urine: No       h/o Kidney Stones: No  h/o Kidney Injury: No       h/o Recurrent UTI: No  h/o Genitourinary Problems: No       h/o Chronic NSAID Use: No         Other Medical Hx:       h/o Diabetes: No             h/o Gastrointestinal, Pancreas or Liver Problems: No       h/o Lung or Heart Problems: No       h/o Hematologic Problems: No  h/o Bleeding or Clotting Problems: No       h/o Cancer: No       h/o Infection Problems: No         Skin Cancer Risk:       h/o more than 50 moles: No       h/o extensive sun exposure: No       h/o melanoma: No       Family h/o melanoma: No         Mental Health Assessment:       h/o Depression: No       h/o Psychiatric Illness: No       h/o Suicidal Attempt(s): No    COVID Status:  Vaccination Up To Date: No, due for next dose  H/o COVID Infection: Yes     Review Of Systems:   A comprehensive review of systems was obtained and negative, except as noted in the HPI or PMH.    Past Medical History:   History was taken from the patient as noted below.  Past Medical  History:   Diagnosis Date    Known health problems: none        Past Social History:   Past Surgical History:   Procedure Laterality Date    NO HISTORY OF SURGERY       Personal or family history of anesthesia problems: No    Family History:   Family History   Problem Relation Age of Onset    No Known Problems Mother     No Known Problems Father     Kidney Disease Brother     Diabetes Brother     No Known Problems Brother     No Known Problems Brother     Liver Disease Maternal Grandmother     Unknown/Adopted Paternal Grandmother     Unknown/Adopted Paternal Grandfather     No Known Problems Daughter           Specific Family History in First Degree Relatives:       FH of Kidney Dz: Yes, brother FH of Diabetes: Yes, brother       FH of Hypertension: No  FH of CAD: No       FH of Cancer: No  FH of Kidney Cancer: No    Personal History:   Social History     Socioeconomic History    Marital status: Single     Spouse name: Not on file    Number of children: Not on file    Years of education: Not on file    Highest education level: Not on file   Occupational History    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   Tobacco Use    Smoking status: Never    Smokeless tobacco: Never   Vaping Use    Vaping status: Not on file   Substance and Sexual Activity    Alcohol use: Not Currently    Drug use: Not Currently    Sexual activity: Not on file   Other Topics Concern    Not on file   Social History Narrative    Not on file     Social Determinants of Health     Financial Resource Strain: Not on file   Food Insecurity: Not on file   Transportation Needs: Not on file   Physical Activity: Not on file   Stress: Not on file   Social Connections: Not on file   Intimate Partner Violence: Not on file   Housing Stability: Not on file          Specific Social History:       Health Insurance Status: Yes, uses  [employer]        Employment Status: Full time  Occupation:                        Living  Arrangements: lives with their daughter       Social Support: Yes       Presence of increased risk for disease transmission behaviors as defined by [facility] guidelines: No        Allergies:  No Known Allergies    Medications:  Current Outpatient Medications   Medication Sig    ferrous sulfate (FEROSUL) 325 (65 Fe) MG tablet Take 1 tablet (325 mg) by mouth daily (with breakfast)     No current facility-administered medications for this visit.     There are no discontinued medications.      Vitals:      1/20/2022     8:05 AM 1/20/2022     8:06 AM 5/26/2023     6:58 AM   Vital Signs   Systolic 108 103 112   Diastolic 72 69 77   Pulse   60   Temperature   97.7  F (36.5  C)   Respirations   16   Weight (LB)   155 lb 4.8 oz   Pain Score   0 (None)   O2   99 %       Exam:   GENERAL APPEARANCE: alert and no distress  HENT: mouth without ulcers or lesions  LYMPHATICS: no cervical or supraclavicular nodes  RESP: lungs clear to auscultation - no rales, rhonchi or wheezes  CV: regular rhythm, normal rate, no rub, no murmur  EDEMA: no LE edema bilaterally  ABDOMEN: soft, nondistended, nontender, bowel sounds normal  MS: extremities normal - no gross deformities noted, no evidence of inflammation in joints, no muscle tenderness  SKIN: no rash  NEURO: normal strength and tone, sensory exam grossly normal, mentation intact and speech normal  PSYCH: mentation appears normal and affect normal/bright    Results:   Labs and imaging were ordered for this visit and reviewed by me.  Recent Results (from the past 336 hour(s))   Comprehensive metabolic panel    Collection Time: 05/26/23  7:06 AM   Result Value Ref Range    Sodium 140 136 - 145 mmol/L    Potassium 3.9 3.4 - 5.3 mmol/L    Chloride 110 (H) 98 - 107 mmol/L    Carbon Dioxide (CO2) 22 22 - 29 mmol/L    Anion Gap 8 7 - 15 mmol/L    Urea Nitrogen 11.8 6.0 - 20.0 mg/dL    Creatinine 0.81 0.51 - 0.95 mg/dL    Calcium 8.7 8.6 - 10.0 mg/dL    Glucose 96 70 - 99 mg/dL    Alkaline Phosphatase  57 35 - 104 U/L    AST 18 10 - 35 U/L    ALT 13 10 - 35 U/L    Protein Total 6.9 6.4 - 8.3 g/dL    Albumin 4.0 3.5 - 5.2 g/dL    Bilirubin Total 0.3 <=1.2 mg/dL    GFR Estimate >90 >60 mL/min/1.73m2   Lipid Profile    Collection Time: 05/26/23  7:06 AM   Result Value Ref Range    Cholesterol 145 <200 mg/dL    Triglycerides 86 <150 mg/dL    Direct Measure HDL 44 (L) >=50 mg/dL    LDL Cholesterol Calculated 84 <=100 mg/dL    Non HDL Cholesterol 101 <130 mg/dL   Uric acid    Collection Time: 05/26/23  7:06 AM   Result Value Ref Range    Uric Acid 4.3 2.4 - 5.7 mg/dL   Phosphorus    Collection Time: 05/26/23  7:06 AM   Result Value Ref Range    Phosphorus 2.9 2.5 - 4.5 mg/dL   Hemoglobin A1c    Collection Time: 05/26/23  7:06 AM   Result Value Ref Range    Hemoglobin A1C 5.3 <5.7 %   INR    Collection Time: 05/26/23  7:06 AM   Result Value Ref Range    INR 1.09 0.85 - 1.15   Partial thromboplastin time    Collection Time: 05/26/23  7:06 AM   Result Value Ref Range    aPTT 30 22 - 38 Seconds   CBC with platelets    Collection Time: 05/26/23  7:06 AM   Result Value Ref Range    WBC Count 7.6 4.0 - 11.0 10e3/uL    RBC Count 4.44 3.80 - 5.20 10e6/uL    Hemoglobin 11.6 (L) 11.7 - 15.7 g/dL    Hematocrit 36.7 35.0 - 47.0 %    MCV 83 78 - 100 fL    MCH 26.1 (L) 26.5 - 33.0 pg    MCHC 31.6 31.5 - 36.5 g/dL    RDW 16.2 (H) 10.0 - 15.0 %    Platelet Count 312 150 - 450 10e3/uL   Treponema Abs w Reflex to RPR and Titer    Collection Time: 05/26/23  7:06 AM   Result Value Ref Range    Treponema Antibody Total Nonreactive Nonreactive   HCG quantitative pregnancy    Collection Time: 05/26/23  7:06 AM   Result Value Ref Range    hCG Quantitative <1 <5 mIU/mL   CMV Antibody IgG    Collection Time: 05/26/23  7:06 AM   Result Value Ref Range    CMV [place] IgG Instrument Value 4.60 (H) <0.60 U/mL    CMV Antibody IgG Positive, suggests recent or past exposure. (A) No detectable antibody.    EBV Capsid Antibody IgG    Collection Time: 05/26/23   7:06 AM   Result Value Ref Range    EBV Capsid [place] IgG Instrument Value >750.0 (H) <18.0 U/mL    EBV Capsid Antibody IgG Positive (A) No detectable antibody.   EBV Capsid Antibody IgM    Collection Time: 05/26/23  7:06 AM   Result Value Ref Range    EBV Capsid [place] IgM Instrument Value <10.0 <36.0 U/mL    EBV Capsid Antibody IgM No detectable antibody. No detectable antibody.   Adult Type and Screen    Collection Time: 05/26/23  7:06 AM   Result Value Ref Range    ABO/RH(D) O POS     Antibody Screen Negative Negative    SPECIMEN EXPIRATION DATE [id number]    Ferritin    Collection Time: 05/26/23  7:06 AM   Result Value Ref Range    Ferritin 17 6 - 175 ng/mL   Iron and iron binding capacity    Collection Time: 05/26/23  7:06 AM   Result Value Ref Range    Iron 33 (L) 37 - 145 ug/dL    Iron Binding Capacity 362 240 - 430 ug/dL    Iron Sat Index 9 (L) 15 - 46 %   ABO and Rh 2nd type and screen required    Collection Time: 05/26/23  7:45 AM   Result Value Ref Range    ABO/RH(D) O POS     SPECIMEN EXPIRATION DATE [id number]    Albumin Random Urine Quantitative with Creat Ratio    Collection Time: 05/26/23  8:26 AM   Result Value Ref Range    Creatinine Urine mg/dL 271.0 mg/dL    Albumin Urine mg/L <12.0 mg/L    Albumin Urine mg/g Cr     Routine UA with microscopic    Collection Time: 05/26/23  8:34 AM   Result Value Ref Range    Color Urine Yellow Colorless, Straw, Light Yellow, Yellow    Appearance Urine Clear Clear    Glucose Urine Negative Negative mg/dL    Bilirubin Urine Negative Negative    Ketones Urine Negative Negative mg/dL    Specific Gravity Urine 1.035 1.003 - 1.035    Blood Urine Negative Negative    pH Urine 6.0 5.0 - 7.0    Protein Albumin Urine 10 (A) Negative mg/dL    Urobilinogen Urine Normal Normal, 2.0 mg/dL    Nitrite Urine Negative Negative    Leukocyte Esterase Urine Negative Negative    Mucus Urine Present (A) None Seen /LPF    RBC Urine <1 <=2 /HPF    WBC Urine 1 <=5 /HPF    Squamous  Epithelials Urine 1 <=1 /HPF    Transitional Epithelials Urine <1 <=1 /HPF   Protein  random urine    Collection Time: 05/26/23  8:34 AM   Result Value Ref Range    Total Protein Urine mg/dL 12.1 1.0 - 14.0 mg/dL    Total Protein UR MG/MG CR 0.04 0.00 - 0.20 mg/mg Cr    Creatinine Urine mg/dL 272.0 mg/dL   EKG 12-lead complete w/read - Clinics    Collection Time: 05/26/23 12:48 PM   Result Value Ref Range    Systolic Blood Pressure  mmHg    Diastolic Blood Pressure  mmHg    Ventricular Rate 59 BPM    Atrial Rate 59 BPM    HI Interval 136 ms    QRS Duration 88 ms     ms    QTc 407 ms    P Axis  degrees    R AXIS -24 degrees    T Axis 129 degrees    Interpretation ECG       Sinus bradycardia  Low voltage QRS  Septal infarct , age undetermined  Abnormal ECG  When compared with ECG of 20-JAN-2022 13:08,  Septal infarct is now Present  Nonspecific T wave abnormality now evident in Inferior leads                 Again, thank you for allowing me to participate in the care of your patient.      Sincerely,    Juan R Mcbride MD

## 2023-05-26 NOTE — LETTER
5/26/2023         RE: Cinthia Reno  [address]  [place] [address]        Dear Colleague,    Thank you for referring your patient, Cinthia Reno, to the [facility] TRANSPLANT CLINIC. Please see a copy of my visit note below.    [facility]  Consult Note     Medical record number: [id number]  YOB: 1990,     Date of Visit:  05/26/2023  Consult requested by the patient for evaluation of kidney donation candidacy.    Assessment and Recommendations: Ms. Reno appears to be a good candidate for kidney donation at this point in the evaluation. The following issues will need to be addressed prior to formal review:    34 yo no sig PMH  1 childbirth   No PSH abdomen  Recip has DSA to her  WIll need to do KPD  Counseled on advanced vs pair  Inclined towards waiting at the moment as a pair  Will think about advanced donation    CT single single  Blood type O    Risks of the surgical procedure including but not limited to the rare risk of mortality discussed in detail. Patient verbalized good understanding and had several pertinent questions which were answered satisfactorily.         Iohexol ordered for today for assessment of adequate kidney function for donation. Will be reviewed when resulted  Donor labs ordered for today and reviewed to include: CBC, CMP, Mg, PO4, hemoglobin A1c, lipid panel, blood type x 2, hemoglobin A1c, UA with microscopic, urine culture, urine protein/cr, INR/PTT, Quantiferon gold, hepatitis C (antibody), hepatitis B panel, HIV, treponemia, West Nile (antibody panel), CMV (antibody panel), EBV (antibody panel), pregnancy screen (for females of childbearing age), PSA (males >50yrs), HLA tissue typing, buccal swab for eplet typing  Social work consult ordered  Dietician consult ordered  Transplant nephrology consult ordered  Transplant coordinator consult ordered  CHEN (independent living donor advovate) consult  ordered  EKG ordered for today and will be reviewed when resulted. Suitable to proceed today with this testing today:  Yes   Chest x-ray ordered for today and will be reviewed when resulted. Suitable to proceed with this testing today:  Yes   CT angio of abdomen and pelvis for anatomical assessment. Images and report to be reviewed when resulted.  Suitable to proceed with this testing today:  No  After review of the above, additional testing/concerns include:  none    The majority of our visit today was spent in counseling regarding the medical and surgical risks of kidney donation, the typical joss-and post-operative experience and recovery/return to work pattern.  We also talked about post-op visits and longer term health care maintenance, as well as the implications of having one remaining kidney. This discussion included, but was not limited to rates of complications such as bleeding, infection, need for transfusion, reoperation, other organ injury, future bowel obstruction, incisional hernia, port site pain, varicocele, venous thrombosis, pulmonary embolism, renal failure, and death (3 per 10,000). At the conclusion of the visit, all questions had been answered and Ms. eRno's candidacy for donation will be reviewed at our Multidisciplinary Donor Selection Committee.     60 min spent on the date of the encounter in chart review, patient visit,  documentation and/or discussion with other providers about the issues documented above.    .    ---------------------------------------------------------------------------------------------------    HPI: Ms. Reno wishes to donate a kidney to brother.           NO  Personal history of cancer    []         Comment:     Personal history of kidney problems   []         Comment:   Personal history of diabetes    []         Comment:                  Bladder emptying problems (prostate, urinary retention) []         Comment:   Neck or Back problems:     []         Comment:    Constipation      []         Comment:       Frequent NSAID use:         []         Comment:       Other:        []         Comment:                Past Medical History:   Diagnosis Date     Anemia      Past Surgical History:   Procedure Laterality Date     NO HISTORY OF SURGERY       Family History   Problem Relation Age of Onset     No Known Problems Mother      No Known Problems Father      Kidney Disease Brother      Diabetes Brother      No Known Problems Brother      No Known Problems Brother      Liver Disease Maternal Grandmother      Unknown/Adopted Paternal Grandmother      Unknown/Adopted Paternal Grandfather      No Known Problems Daughter      Social History     Socioeconomic History     Marital status: Single     Spouse name: Not on file     Number of children: Not on file     Years of education: Not on file     Highest education level: Not on file   Occupational History     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   Tobacco Use     Smoking status: Never     Smokeless tobacco: Never   Vaping Use     Vaping status: Not on file   Substance and Sexual Activity     Alcohol use: Not Currently     Drug use: Not Currently     Sexual activity: Not on file   Other Topics Concern     Not on file   Social History Narrative     Not on file     Social Determinants of Health     Financial Resource Strain: Not on file   Food Insecurity: Not on file   Transportation Needs: Not on file   Physical Activity: Not on file   Stress: Not on file   Social Connections: Not on file   Intimate Partner Violence: Not on file   Housing Stability: Not on file       ROS:   CONSTITUTIONAL:  No fevers or chills  EYES: negative for icterus  ENT:  negative for hearing loss, tinnitus and sore throat  RESPIRATORY:  negative for cough, sputum, dyspnea  CARDIOVASCULAR:  negative for chest pain  GASTROINTESTINAL:  negative for nausea, vomiting, diarrhea or constipation  GENITOURINARY:  negative for incontinence, dysuria,  bladder emptying problems  HEME:  No easy bruising  INTEGUMENT:  negative for rash and pruritus  NEURO:  Negative for headache, seizure disorder    Allergies:   No Known Allergies    Medications:  Prescription Medications as of 5/26/2023       Rx Number Disp Refills Start End Last Dispensed Date Next Fill Date Owning Pharmacy    ferrous sulfate (FEROSUL) 325 (65 Fe) MG tablet    5/26/2023        Sig: Take 1 tablet (325 mg) by mouth daily (with breakfast)    Class: Historical    Route: Oral      Clinic-Administered Medications as of 5/26/2023       Dose Frequency Start End    iohexol (OMNIPAQUE) 350 MG/ML injectable solution 4 mL (Completed) 4 mL ONCE 5/26/2023 5/26/2023    Route: Intravenous        Exam:   Temp:  [97.6  F (36.4  C)-97.7  F (36.5  C)] 97.6  F (36.4  C)  Pulse:  [59-60] 59  Resp:  [16] 16  BP: (109-117)/(69-77) 114/71  SpO2:  [99 %-100 %] 100 %  Appearance: in no apparent distress.   Skin: normal  Head and Neck: Normal, no rashes or jaundice  Respiratory: normal respiratory excursions, no audible wheeze  Cardiovascular: RRR  Abdomen: rounded, No surgical scars   Extremeties: Edema, none  Neuro: without deficit       Labs:   ABO: O POS  Chemistries:   Recent Labs   Lab Test 05/26/23  0706 01/20/22  0719    143   POTASSIUM 3.9 4.1   CHLORIDE 110* 111*   CO2 22 23   ANIONGAP 8 9   GLC 96 92   BUN 11.8 14   CR 0.81 0.75   ELLA 8.7 8.6       Urine Studies:   Recent Labs   Lab Test 05/26/23  0834 01/20/22  1245   COLOR Yellow Straw   APPEARANCE Clear Clear   URINEGLC Negative Negative   URINEBILI Negative Negative   URINEKETONE Negative Negative   SG 1.035 1.020   UBLD Negative Negative   URINEPH 6.0 6.0   PROTEIN 10* Negative   NITRITE Negative Negative   LEUKEST Negative Negative   RBCU <1 <1   WBCU 1 1     Recent Labs   Lab Test 01/20/22  1245   MICROL <5       Hematology:      Recent Labs   Lab Test 05/26/23  0706   WBC 7.6   RBC 4.44   HGB 11.6*   HCT 36.7   MCV 83   MCH 26.1*   MCHC 31.6   RDW  16.2*          Coags:   Recent Labs   Lab Test 05/26/23  0706   INR 1.09       Lipid Profile:   Cholesterol   Date Value Ref Range Status   05/26/2023 145 <200 mg/dL Final     Triglycerides   Date Value Ref Range Status   05/26/2023 86 <150 mg/dL Final     Direct Measure HDL   Date Value Ref Range Status   05/26/2023 44 (L) >=50 mg/dL Final     LDL Cholesterol Calculated   Date Value Ref Range Status   05/26/2023 84 <=100 mg/dL Final     Non HDL Cholesterol   Date Value Ref Range Status   05/26/2023 101 <130 mg/dL Final       Virals:  CMV and EBV pending.     Recent Labs   Lab Test 01/20/22  0719 01/20/22  0718   HBCAB Nonreactive  --    HEPBANG  --  Nonreactive        Hepatitis C Antibody   Date Value Ref Range Status   01/20/2022 Nonreactive Nonreactive Final       Hepatitis C Antibody   Date Value Ref Range Status   01/20/2022 Nonreactive Nonreactive Final           Again, thank you for allowing me to participate in the care of your patient.        Sincerely,        Parish Miranda MD

## 2023-05-26 NOTE — PROGRESS NOTES
"TRANSPLANT NEPHROLOGY DONOR EVALUATION    Assessment and Plan:  # Prospective Kidney Transplant Donor: Patient with one issue that needs to be addressed prior to donation. Patient's blood pressure is acceptable at this visit, kidney function appears to be acceptable with Iohexol pending, and urinalysis is bland.   -recommend she see GYN but should not hold up donation   -Obtain echo given change in EKG from last year   -Obtain fecal occult blood. If negative then she is iron deficient from heavy periods.     # Lack of health insurance:   -Uses [employer]     # Woman of child bearing age:              -discussed risks of pregnancy post donation including gestational DM, gestational HTN,  delivery, small for gestational age infant, increased risk for preeclampsia, miscarriage    # Anemia:    -Hb 10.9 in 2022 thought due to heavy menstrual periods, increased to 11.6. Per patient this has improved since prior evaluation and now having \"normal periods\".    -Has been taking iron supplement for the past month   -Iron deficient based on labs   -She should see GYN but should not hold up donation. Obtain fecal occult blood    # Cardiac risk:   -EKG with non specific inferior lead T wave changes. Obtain echo    # Healthcare maintenance:   -Pap smear: 3/3/22 negative, repeat in 5 years     Discussed the risks of donating a kidney, including the surgical risk and the possible risks of living with one kidney.    Education about expected post-donation kidney function and how chronic kidney disease (CKD) and end stage kidney disease (ESKD) might potentially impact the donor in the future, include, but not limited to:       - On average, donors will have 25-35% permanent loss of kidney function at donation.       - Baseline risk of ESKD may slightly exceed that of members of the general population with the same demographic profile.       - Donor risks must be interpreted in light of known epidemiology of both " CKD or ESKD, such as that CKD generally develops in midlife (40-50 years old) and ESKD generally develops after age 60.       - Medical evaluation of young potential donors cannot predict lifetime risk of CKD or ESKD.       - Donors may be at higher risk for CKD if they sustain damage to the remaining kidney.       - Development of CKD and progression of ESKD may be more rapid with only 1 kidney.       - Some type of kidney replacement therapy, either kidney transplant or dialysis, is required when reaching ESKD.    Potential medical or surgical risks include, but not limited to:       - Death.       - Scars, pain, fatigue, and other consequences typical of any surgical procedure.       - Decreased kidney function.       - Abdominal or bowel symptoms, such as bloating and nausea, and developing bowel obstruction.       - Kidney failure (ESKD) and the need for a kidney transplant or dialysis for the donor.       - Impact of obesity, hypertension, or other donor-specific medical conditions on morbidity and mortality of the potential donor.    Patients overall evaluation will be discussed with the transplant team and a final recommendation on the patients' suitability to be a kidney transplant donor will be made at that time.    Juan R Mcbride MD, [facility]  Transplant Nephrology  Pager: [phone]    I spent 73 minutes on the date of the encounter doing chart review, review of outside records, review of test results, interpretation of tests, patient visit and documentation      Consult:  Cinthia Reno was seen in consultation at the request of Dr. Parish Miranda for evaluation as a potential kidney transplant donor.    Reason for Visit:  Cinthia Reno is a 33 year old female who presents for a kidney donor evaluation.  Patient would like to donate to her half brother.    Present Condition and Donor-Related Medical History:    Ms. Reno is a 33yF who was previously evaluated to be a living kidney donor to her half  brother in 1/2022, recommended doing PAP smear, repeating Hb due to heavy menstrual cycles but she was lost to follow up. She then called 4/24/23 stating that she did complete her PAP smear, has been taking iron supplements, and would come in for refresher eval. She is ABO O with iohexol raw of 127ml/min. Her CTA showed single/single/single bilaterally with 137.5cc R kidney and 153cc L kidney.          Kidney Disease Hx:       h/o Kidney Problems: No  Family h/o Genetic Kidney Disease: No       h/o Hypertension: No    Usual Blood Pressure: 110s systolic       h/o Protein in Urine: No  h/o Blood in Urine: No       h/o Kidney Stones: No  h/o Kidney Injury: No       h/o Recurrent UTI: No  h/o Genitourinary Problems: No       h/o Chronic NSAID Use: No         Other Medical Hx:       h/o Diabetes: No             h/o Gastrointestinal, Pancreas or Liver Problems: No       h/o Lung or Heart Problems: No       h/o Hematologic Problems: No  h/o Bleeding or Clotting Problems: No       h/o Cancer: No       h/o Infection Problems: No         Skin Cancer Risk:       h/o more than 50 moles: No       h/o extensive sun exposure: No       h/o melanoma: No       Family h/o melanoma: No         Mental Health Assessment:       h/o Depression: No       h/o Psychiatric Illness: No       h/o Suicidal Attempt(s): No    COVID Status:  Vaccination Up To Date: No, due for next dose  H/o COVID Infection: Yes     Review Of Systems:   A comprehensive review of systems was obtained and negative, except as noted in the HPI or PMH.    Past Medical History:   History was taken from the patient as noted below.  Past Medical History:   Diagnosis Date     Known health problems: none        Past Social History:   Past Surgical History:   Procedure Laterality Date     NO HISTORY OF SURGERY       Personal or family history of anesthesia problems: No    Family History:   Family History   Problem Relation Age of Onset     No Known Problems Mother      No  Known Problems Father      Kidney Disease Brother      Diabetes Brother      No Known Problems Brother      No Known Problems Brother      Liver Disease Maternal Grandmother      Unknown/Adopted Paternal Grandmother      Unknown/Adopted Paternal Grandfather      No Known Problems Daughter           Specific Family History in First Degree Relatives:       FH of Kidney Dz: Yes, brother FH of Diabetes: Yes, brother       FH of Hypertension: No  FH of CAD: No       FH of Cancer: No  FH of Kidney Cancer: No    Personal History:   Social History     Socioeconomic History     Marital status: Single     Spouse name: Not on file     Number of children: Not on file     Years of education: Not on file     Highest education level: Not on file   Occupational History     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   Tobacco Use     Smoking status: Never     Smokeless tobacco: Never   Vaping Use     Vaping status: Not on file   Substance and Sexual Activity     Alcohol use: Not Currently     Drug use: Not Currently     Sexual activity: Not on file   Other Topics Concern     Not on file   Social History Narrative     Not on file     Social Determinants of Health     Financial Resource Strain: Not on file   Food Insecurity: Not on file   Transportation Needs: Not on file   Physical Activity: Not on file   Stress: Not on file   Social Connections: Not on file   Intimate Partner Violence: Not on file   Housing Stability: Not on file          Specific Social History:       Health Insurance Status: Yes, uses [employer]        Employment Status: Full time  Occupation:                        Living Arrangements: lives with their daughter       Social Support: Yes       Presence of increased risk for disease transmission behaviors as defined by PHS guidelines: No        Allergies:  No Known Allergies    Medications:  Current Outpatient Medications   Medication Sig     ferrous sulfate (FEROSUL) 325  (65 Fe) MG tablet Take 1 tablet (325 mg) by mouth daily (with breakfast)     No current facility-administered medications for this visit.     There are no discontinued medications.      Vitals:      1/20/2022     8:05 AM 1/20/2022     8:06 AM 5/26/2023     6:58 AM   Vital Signs   Systolic 108 103 112   Diastolic 72 69 77   Pulse   60   Temperature   97.7  F (36.5  C)   Respirations   16   Weight (LB)   155 lb 4.8 oz   Pain Score   0 (None)   O2   99 %       Exam:   GENERAL APPEARANCE: alert and no distress  HENT: mouth without ulcers or lesions  LYMPHATICS: no cervical or supraclavicular nodes  RESP: lungs clear to auscultation - no rales, rhonchi or wheezes  CV: regular rhythm, normal rate, no rub, no murmur  EDEMA: no LE edema bilaterally  ABDOMEN: soft, nondistended, nontender, bowel sounds normal  MS: extremities normal - no gross deformities noted, no evidence of inflammation in joints, no muscle tenderness  SKIN: no rash  NEURO: normal strength and tone, sensory exam grossly normal, mentation intact and speech normal  PSYCH: mentation appears normal and affect normal/bright    Results:   Labs and imaging were ordered for this visit and reviewed by me.  Recent Results (from the past 336 hour(s))   Comprehensive metabolic panel    Collection Time: 05/26/23  7:06 AM   Result Value Ref Range    Sodium 140 136 - 145 mmol/L    Potassium 3.9 3.4 - 5.3 mmol/L    Chloride 110 (H) 98 - 107 mmol/L    Carbon Dioxide (CO2) 22 22 - 29 mmol/L    Anion Gap 8 7 - 15 mmol/L    Urea Nitrogen 11.8 6.0 - 20.0 mg/dL    Creatinine 0.81 0.51 - 0.95 mg/dL    Calcium 8.7 8.6 - 10.0 mg/dL    Glucose 96 70 - 99 mg/dL    Alkaline Phosphatase 57 35 - 104 U/L    AST 18 10 - 35 U/L    ALT 13 10 - 35 U/L    Protein Total 6.9 6.4 - 8.3 g/dL    Albumin 4.0 3.5 - 5.2 g/dL    Bilirubin Total 0.3 <=1.2 mg/dL    GFR Estimate >90 >60 mL/min/1.73m2   Lipid Profile    Collection Time: 05/26/23  7:06 AM   Result Value Ref Range    Cholesterol 145 <200  mg/dL    Triglycerides 86 <150 mg/dL    Direct Measure HDL 44 (L) >=50 mg/dL    LDL Cholesterol Calculated 84 <=100 mg/dL    Non HDL Cholesterol 101 <130 mg/dL   Uric acid    Collection Time: 05/26/23  7:06 AM   Result Value Ref Range    Uric Acid 4.3 2.4 - 5.7 mg/dL   Phosphorus    Collection Time: 05/26/23  7:06 AM   Result Value Ref Range    Phosphorus 2.9 2.5 - 4.5 mg/dL   Hemoglobin A1c    Collection Time: 05/26/23  7:06 AM   Result Value Ref Range    Hemoglobin A1C 5.3 <5.7 %   INR    Collection Time: 05/26/23  7:06 AM   Result Value Ref Range    INR 1.09 0.85 - 1.15   Partial thromboplastin time    Collection Time: 05/26/23  7:06 AM   Result Value Ref Range    aPTT 30 22 - 38 Seconds   CBC with platelets    Collection Time: 05/26/23  7:06 AM   Result Value Ref Range    WBC Count 7.6 4.0 - 11.0 10e3/uL    RBC Count 4.44 3.80 - 5.20 10e6/uL    Hemoglobin 11.6 (L) 11.7 - 15.7 g/dL    Hematocrit 36.7 35.0 - 47.0 %    MCV 83 78 - 100 fL    MCH 26.1 (L) 26.5 - 33.0 pg    MCHC 31.6 31.5 - 36.5 g/dL    RDW 16.2 (H) 10.0 - 15.0 %    Platelet Count 312 150 - 450 10e3/uL   Treponema Abs w Reflex to RPR and Titer    Collection Time: 05/26/23  7:06 AM   Result Value Ref Range    Treponema Antibody Total Nonreactive Nonreactive   HCG quantitative pregnancy    Collection Time: 05/26/23  7:06 AM   Result Value Ref Range    hCG Quantitative <1 <5 mIU/mL   CMV Antibody IgG    Collection Time: 05/26/23  7:06 AM   Result Value Ref Range    CMV Ladonna IgG Instrument Value 4.60 (H) <0.60 U/mL    CMV Antibody IgG Positive, suggests recent or past exposure. (A) No detectable antibody.    EBV Capsid Antibody IgG    Collection Time: 05/26/23  7:06 AM   Result Value Ref Range    EBV Capsid Ladonna IgG Instrument Value >750.0 (H) <18.0 U/mL    EBV Capsid Antibody IgG Positive (A) No detectable antibody.   EBV Capsid Antibody IgM    Collection Time: 05/26/23  7:06 AM   Result Value Ref Range    EBV Capsid Ladonna IgM Instrument Value <10.0 <36.0  U/mL    EBV Capsid Antibody IgM No detectable antibody. No detectable antibody.   Adult Type and Screen    Collection Time: 05/26/23  7:06 AM   Result Value Ref Range    ABO/RH(D) O POS     Antibody Screen Negative Negative    SPECIMEN EXPIRATION DATE [id number]    Ferritin    Collection Time: 05/26/23  7:06 AM   Result Value Ref Range    Ferritin 17 6 - 175 ng/mL   Iron and iron binding capacity    Collection Time: 05/26/23  7:06 AM   Result Value Ref Range    Iron 33 (L) 37 - 145 ug/dL    Iron Binding Capacity 362 240 - 430 ug/dL    Iron Sat Index 9 (L) 15 - 46 %   ABO and Rh 2nd type and screen required    Collection Time: 05/26/23  7:45 AM   Result Value Ref Range    ABO/RH(D) O POS     SPECIMEN EXPIRATION DATE [id number]    Albumin Random Urine Quantitative with Creat Ratio    Collection Time: 05/26/23  8:26 AM   Result Value Ref Range    Creatinine Urine mg/dL 271.0 mg/dL    Albumin Urine mg/L <12.0 mg/L    Albumin Urine mg/g Cr     Routine UA with microscopic    Collection Time: 05/26/23  8:34 AM   Result Value Ref Range    Color Urine Yellow Colorless, Straw, Light Yellow, Yellow    Appearance Urine Clear Clear    Glucose Urine Negative Negative mg/dL    Bilirubin Urine Negative Negative    Ketones Urine Negative Negative mg/dL    Specific Gravity Urine 1.035 1.003 - 1.035    Blood Urine Negative Negative    pH Urine 6.0 5.0 - 7.0    Protein Albumin Urine 10 (A) Negative mg/dL    Urobilinogen Urine Normal Normal, 2.0 mg/dL    Nitrite Urine Negative Negative    Leukocyte Esterase Urine Negative Negative    Mucus Urine Present (A) None Seen /LPF    RBC Urine <1 <=2 /HPF    WBC Urine 1 <=5 /HPF    Squamous Epithelials Urine 1 <=1 /HPF    Transitional Epithelials Urine <1 <=1 /HPF   Protein  random urine    Collection Time: 05/26/23  8:34 AM   Result Value Ref Range    Total Protein Urine mg/dL 12.1 1.0 - 14.0 mg/dL    Total Protein UR MG/MG CR 0.04 0.00 - 0.20 mg/mg Cr    Creatinine Urine mg/dL 272.0  mg/dL   EKG 12-lead complete w/read - Clinics    Collection Time: 05/26/23 12:48 PM   Result Value Ref Range    Systolic Blood Pressure  mmHg    Diastolic Blood Pressure  mmHg    Ventricular Rate 59 BPM    Atrial Rate 59 BPM    OK Interval 136 ms    QRS Duration 88 ms     ms    QTc 407 ms    P Axis  degrees    R AXIS -24 degrees    T Axis 129 degrees    Interpretation ECG       Sinus bradycardia  Low voltage QRS  Septal infarct , age undetermined  Abnormal ECG  When compared with ECG of 20-JAN-2022 13:08,  Septal infarct is now Present  Nonspecific T wave abnormality now evident in Inferior leads

## 2023-05-26 NOTE — PROGRESS NOTES
Follow up donor eval from 1/2022. Time spent with pt <8 min/no charge.    Weight:   Wt Readings from Last 5 Encounters:   05/26/23 70.6 kg (155 lb 9.6 oz)   05/26/23 70.4 kg (155 lb 4.8 oz)   01/20/22 64.6 kg (142 lb 8 oz)     Exercise: running 2x/week, new routine for the past 1 month   Vits/Supps: was advised to take iron supplements after prior eval, yet reports taking for some time, yet got busy and had stopped for about 6 months; recently restarted supplements x 1 month     Recent Labs   Lab Test 05/26/23  0706 01/20/22  0719   CHOL 145 160   HDL 44* 50   LDL 84 95   TRIG 86 76     BG 96; prev 92   A1c 5.3; prev 5.2   BP wnl x 3

## 2023-05-26 NOTE — NURSING NOTE
"Chief Complaint   Patient presents with     Transplant Donor Evaluation     Kidney donor evaluation     /71   Pulse 59   Temp 97.6  F (36.4  C) (Oral)   Resp 16   Ht 1.689 m (5' 6.5\")   Wt 70.6 kg (155 lb 9.6 oz)   SpO2 100%   BMI 24.74 kg/m      BP1: 117/69  BP2: 109/69  BP 3:  116/76    ANTOINETTE VICENTE RN on 5/26/2023 at 8:09 AM    "

## 2023-05-27 LAB
HBV CORE AB SERPL QL IA: NONREACTIVE
HBV SURFACE AB SERPL IA-ACNC: 487.72 M[IU]/ML
HBV SURFACE AB SERPL IA-ACNC: REACTIVE M[IU]/ML
HBV SURFACE AG SERPL QL IA: NONREACTIVE
HCV AB SERPL QL IA: NONREACTIVE
HIV 1+2 AB+HIV1 P24 AG SERPL QL IA: NONREACTIVE

## 2023-05-28 LAB
GAMMA INTERFERON BACKGROUND BLD IA-ACNC: 0.07 IU/ML
M TB IFN-G BLD-IMP: NEGATIVE
M TB IFN-G CD4+ BCKGRND COR BLD-ACNC: 9.93 IU/ML
MITOGEN IGNF BCKGRD COR BLD-ACNC: -0.01 IU/ML
MITOGEN IGNF BCKGRD COR BLD-ACNC: 0.06 IU/ML
QUANTIFERON MITOGEN: 10 IU/ML
QUANTIFERON NIL TUBE: 0.07 IU/ML
QUANTIFERON TB1 TUBE: 0.13 IU/ML
QUANTIFERON TB2 TUBE: 0.06

## 2023-05-30 LAB
ATRIAL RATE - MUSE: 59 BPM
BSA: 1.83 M2
DIASTOLIC BLOOD PRESSURE - MUSE: NORMAL MMHG
INTERPRETATION ECG - MUSE: NORMAL
IOHEXOL CL UR+SERPL-VRATE: 103 ML/MIN
IOHEXOL CL UR+SERPL-VRATE: 3.27 MG/DL
IOHEXOL CL UR+SERPL-VRATE: 7.35 MG/DL
IOHEXOL CL UR+SERPL-VRATE: 97 /1.73 M2
P AXIS - MUSE: NORMAL DEGREES
PR INTERVAL - MUSE: 136 MS
QRS DURATION - MUSE: 88 MS
QT - MUSE: 412 MS
QTC - MUSE: 407 MS
R AXIS - MUSE: -24 DEGREES
SYSTOLIC BLOOD PRESSURE - MUSE: NORMAL MMHG
T AXIS - MUSE: 129 DEGREES
VENTRICULAR RATE- MUSE: 59 BPM
WNV IGG SER IA-ACNC: 0.15 IV
WNV IGM SER IA-ACNC: 0.03 IV

## 2023-05-30 NOTE — PROGRESS NOTES
"Psychosocial Evaluation-- UPDATE APPT  Living Organ Donation per OPTN Policy 14.1.A  Organ Type: living kidney donor   Presenting Information:  Cinthia presents to the [facility] to complete a psychosocial update appt since she is interested in becoming a living kidney donor for her half brother.  Presents to clinic alone today.   PERSONAL BACKGROUND:  Current Living Situation: Cinthia lives in [place] with her daughter (13). She no longer lives with her boyfriend, as they broke up a few months ago.     Education/Employment/Financial Situation: Graduating with her bachelor degree is social work in June. She is now the manager of a [employer]. Her employer is supportive of her and would use [employer] donor shield.     Health Insurance Status: [facility]    Mental Health: Per initial evaluation, Cinthia used to see a counselor many years back for adjustment disorders a life stress. She also has a remote history of self harm behaviors 13 years ago. She denies any new MH symptoms and states that she is doing well, despite her recent break up. She states that if she were to have mood concerns, she would seek help through [employer] at work.     Alcohol and Drug Use/Abuse/Dependency: She does not use alcohol currently, as she is trying to \"be healthier.\" No other illicit drugs.     Cigarette Use: none     Legal: none    Support System: Her mother would be her main caregiver post surgery.     DONOR SPECIFIC INFORMATION:  Relationship to Recipient: sibling     Decision Process/Motivation to Donate: She states that she has had some \"time to think\" since her initial evaluation and feels more comfortable donating now that she had time to research and process. Her biggest worry is surgery itself.     High risk behaviors as defined by [place] Public Health Services (PHS) that have potential to increase risk of disease transmission were reviewed " and no risks identified.     PREPARATION FOR DONATION, RECOVERY, AND POTENTIAL SHORT-LONG-TERM OUTCOMES:  Understanding of the Living Donation Process:  We discussed the role of living donor .  Short and long term medical and psychosocial risks to both, donor and recipient were reviewed and she expressed understanding.  Post surgical restrictions (2 weeks no driving, 6 weeks no lifting over 10 lbs) were reviewed and she appears capable of adhering to the post surgical requirements. The need for a caregiver was discussed and she thinks her mother would be her main support.  The risk of poor psychosocial outcome including problems with body image, post-surgery depression or anxiety, or feelings of emotional distress or bereavement if recipient experiences any recurrent disease, poor outcome or death was reviewed.  Additionally, potential financial implications, including the risk of having difficulty obtaining health care insurance, life insurance, disability insurance, or long term care insurance were reviewed, as were available donor grants to assist with donor related expenses.      We also discussed some unique issues that arise with paired kidney donation, which include the uncertainty of the timing and the importance of having a employment situation and support system that is able to provide sustained support and flexibility.    She appears capable of understanding this information and making an informed medical decision.    Impressions/Recommendations:   Cinthia is highly motivated to donate a kidney to her brother.  Her decision to donate is free of inducement, coercion, or other undue pressure.   Her housing, finances and employment are stable.  No current/active mental health or chemical abuse issues were identified.  The need for a caregiver was reviewed and she is able to identify a plan to meet her post operative care needs.  She appears capable of making an informed medical decision.  No  psychosocial contraindications to living organ donation were identified and I support Cinthia s desire to donate a kidney to her brother.      She did express some concern after this SW visit about advanced donation which should be processed with medical team.        Contact Information:   OPHELIA Arrington, [facility] Region[facility]   Living Donor   [facility] [place], [facility]  Direct: [phone]  E-Mail: [email or web address]      Time Spent: 40 minutes

## 2023-05-31 ENCOUNTER — COMMITTEE REVIEW (OUTPATIENT)
Dept: TRANSPLANT | Facility: CLINIC | Age: 33
End: 2023-05-31

## 2023-05-31 ENCOUNTER — TELEPHONE (OUTPATIENT)
Dept: TRANSPLANT | Facility: CLINIC | Age: 33
End: 2023-05-31

## 2023-05-31 NOTE — TELEPHONE ENCOUNTER
Spoke to Cinthia regarding committee review results:    Committee Discussion Details:   1. Need to see her GYN and get a fecal occult   2. Need to complete an echocardiogram

## 2023-05-31 NOTE — LETTER
PHYSICIAN ORDERS      DATE & TIME ISSUED: May 31, 2023 9:41 AM  PATIENT NAME: Cinthia Reno   : 1990     [facility] MR# [if applicable]: [id number]     DIAGNOSIS:  Potential Kidney Donor  ICD-10 CODE: z00.5     Complete echocardiogram       Please fax these results to [phone]      Fay Khalil MD FACS  Assistant Professor of Surgery  Director, Living Kidney Donor Program.

## 2023-05-31 NOTE — LETTER
REIMBURSEMENT INFORMATION FOR LIVING ORGAN DONORS    LIVING ORGAN DONOR: This form MUST accompany & remain attached to Orders &  given to Provider and/or Healthcare Facility Business Office    PROVIDER/FACILITY INSTRUCTIONS: By accepting to perform these services for living organ  donation, the provider/facility agrees to exclusively bill the [facility] instead of billing  the patient or any insurance provider and agrees to accept the reimbursement, as described below, as  payment in full for services rendered.    PROVIDER BILLING INSTRUCTIONS:  1. [facility] agrees to pay for all authorized testing ordered by our transplant  program that is related to living organ donation. The attached orders/tests are part of the donor  Evaluation.    2. Do not bill the donor or donor's insurance. Send an itemized invoice, claim or statement to:    [facility]  Transplant Finance/Donor Billing  [address] [place] [address], [place] [address]    3. Billing statements must include the patient first and last name, date of birth, the CPT procedure code  and date of service. Please bill service on the ORIGINAL UBO4 or 1500 with appropriate CPT/HCPCS  codes along with W-9 and send to the above address to insure timely reimbursement.    4. Claims should be submitted no later than six months from the date when services are rendered.  Claims denied for late submission should not be billed to the donor or their private insurance carrier.    5. [facility] will reimburse all charges at 100% of the Medicare Fee Schedule as  defined in the Code of Federal Regulations (CFR) 42, Chapter IV. This is to be considered payment  in full. [facility], the patient, and/or the patient's insurance are NOT to  be billed any balance, co-payment, or deductible, per Medicare regulations. **ATTN: Facility  providing services for attached/enclosed  Living Donor Orders; If facility does NOT AGREE to  the reimbursement rate stated above, PLEASE DENY SERVICES & refer Donor/patient back to  their [facility] Coordinator Transplant Center.    6. Patients are NOT to make any payments at the time of service.    Please forward this information to your billing department so that a donor account can be set up with  these instructions.    Should you have any questions, please contact the Donor Billing office at [phone],  Monday - Friday, 8:00 a.m. to 4:00 p.m.   Thank you for your assistance.

## 2023-05-31 NOTE — COMMITTEE REVIEW
Living Donor Committee Review Note Evaluation Date: 1/20/2022  Committee Review Date: 5/31/2023    Donor being evaluated for: Kidney    Transplant Phase: Evaluation  Transplant Status: Active    Transplant Coordinator: Fabiola Hi  Transplant Surgeon:       Committee Review Members:  Independent Living Donor Advocate Tiffany Rodriguez, [facility]   Nephrology Chandni Martinez MD   Nutrition Tete Marino, RD   Pharmacist Angelito Titus, [facility]    - Clinical Sabrina Veronica   Transplant Genna Latesha Recio, RN, Mandy Flores, RN, Sommer Faust, NP, Janet Barragan LPN, Nitza Loredo, JANE, Dee Soto, JANE, Fabiola Kinney RN   Transplant Surgery Parish Miranda MD       Transplant Eligibility: Acceptable Physical Health, Acceptable Mental Health    Committee Review Decision: Needs Re-presentation    Relative Contraindications: None    Absolute Contraindications: None    Committee Chair Parish Miranda MD verbally attested to the committee's decision.    Committee Discussion Details:   1. Need to see her GYN and get a fecal occult   2. Need to complete an echocardiogram

## 2023-07-14 ENCOUNTER — TRANSFERRED RECORDS (OUTPATIENT)
Dept: HEALTH INFORMATION MANAGEMENT | Facility: CLINIC | Age: 33
End: 2023-07-14

## 2024-02-25 ENCOUNTER — HEALTH MAINTENANCE LETTER (OUTPATIENT)
Age: 34
End: 2024-02-25

## 2025-03-15 ENCOUNTER — HEALTH MAINTENANCE LETTER (OUTPATIENT)
Age: 35
End: 2025-03-15